# Patient Record
Sex: FEMALE | Race: WHITE | NOT HISPANIC OR LATINO | Employment: FULL TIME | URBAN - NONMETROPOLITAN AREA
[De-identification: names, ages, dates, MRNs, and addresses within clinical notes are randomized per-mention and may not be internally consistent; named-entity substitution may affect disease eponyms.]

---

## 2023-05-24 ENCOUNTER — OFFICE VISIT (OUTPATIENT)
Dept: PRIMARY CARE | Facility: CLINIC | Age: 48
End: 2023-05-24
Payer: COMMERCIAL

## 2023-05-24 VITALS
DIASTOLIC BLOOD PRESSURE: 85 MMHG | WEIGHT: 165 LBS | SYSTOLIC BLOOD PRESSURE: 133 MMHG | BODY MASS INDEX: 29.23 KG/M2 | HEART RATE: 94 BPM | HEIGHT: 63 IN

## 2023-05-24 DIAGNOSIS — G43.709 CHRONIC MIGRAINE WITHOUT AURA WITHOUT STATUS MIGRAINOSUS, NOT INTRACTABLE: ICD-10-CM

## 2023-05-24 DIAGNOSIS — Z12.31 SCREENING MAMMOGRAM, ENCOUNTER FOR: Primary | ICD-10-CM

## 2023-05-24 DIAGNOSIS — F41.9 ANXIETY: ICD-10-CM

## 2023-05-24 DIAGNOSIS — J30.2 SEASONAL ALLERGIES: ICD-10-CM

## 2023-05-24 DIAGNOSIS — I10 PRIMARY HYPERTENSION: ICD-10-CM

## 2023-05-24 DIAGNOSIS — G43.709 CHRONIC MIGRAINE WITHOUT AURA WITHOUT STATUS MIGRAINOSUS, NOT INTRACTABLE: Primary | ICD-10-CM

## 2023-05-24 DIAGNOSIS — F51.01 PRIMARY INSOMNIA: ICD-10-CM

## 2023-05-24 DIAGNOSIS — K21.9 GASTROESOPHAGEAL REFLUX DISEASE WITHOUT ESOPHAGITIS: ICD-10-CM

## 2023-05-24 PROBLEM — G47.00 INSOMNIA: Status: ACTIVE | Noted: 2023-05-24

## 2023-05-24 PROCEDURE — 3079F DIAST BP 80-89 MM HG: CPT | Performed by: INTERNAL MEDICINE

## 2023-05-24 PROCEDURE — 1036F TOBACCO NON-USER: CPT | Performed by: INTERNAL MEDICINE

## 2023-05-24 PROCEDURE — 99214 OFFICE O/P EST MOD 30 MIN: CPT | Performed by: INTERNAL MEDICINE

## 2023-05-24 PROCEDURE — 3075F SYST BP GE 130 - 139MM HG: CPT | Performed by: INTERNAL MEDICINE

## 2023-05-24 RX ORDER — GALCANEZUMAB 120 MG/ML
INJECTION, SOLUTION SUBCUTANEOUS
COMMUNITY
Start: 2023-04-19 | End: 2023-05-24 | Stop reason: SDUPTHER

## 2023-05-24 RX ORDER — GALCANEZUMAB 120 MG/ML
INJECTION, SOLUTION SUBCUTANEOUS
Qty: 1 ML | Refills: 11 | OUTPATIENT
Start: 2023-05-24

## 2023-05-24 RX ORDER — GABAPENTIN 100 MG/1
1 CAPSULE ORAL 2 TIMES DAILY
COMMUNITY
Start: 2022-05-19 | End: 2023-05-24 | Stop reason: SDUPTHER

## 2023-05-24 RX ORDER — GALCANEZUMAB 120 MG/ML
120 INJECTION, SOLUTION SUBCUTANEOUS
Qty: 1 ML | Refills: 3 | Status: SHIPPED | OUTPATIENT
Start: 2023-05-24

## 2023-05-24 RX ORDER — MULTIVITAMIN
1 TABLET ORAL DAILY
COMMUNITY
Start: 2022-05-16

## 2023-05-24 RX ORDER — BUPROPION HYDROCHLORIDE 150 MG/1
150 TABLET ORAL DAILY
COMMUNITY
End: 2023-05-24 | Stop reason: ALTCHOICE

## 2023-05-24 RX ORDER — OMEPRAZOLE 10 MG/1
20 CAPSULE, DELAYED RELEASE ORAL DAILY
COMMUNITY

## 2023-05-24 RX ORDER — GABAPENTIN 100 MG/1
200 CAPSULE ORAL 3 TIMES DAILY
Qty: 180 CAPSULE | Refills: 2 | Status: SHIPPED | OUTPATIENT
Start: 2023-05-24 | End: 2023-08-28

## 2023-05-24 RX ORDER — RIZATRIPTAN BENZOATE 10 MG/1
10 TABLET ORAL ONCE AS NEEDED
Qty: 9 TABLET | Refills: 3 | Status: SHIPPED | OUTPATIENT
Start: 2023-05-24 | End: 2023-06-23

## 2023-05-24 RX ORDER — LORATADINE 10 MG/1
1 CAPSULE, LIQUID FILLED ORAL DAILY PRN
COMMUNITY
Start: 2022-05-19

## 2023-05-24 RX ORDER — GALCANEZUMAB 120 MG/ML
INJECTION, SOLUTION SUBCUTANEOUS
COMMUNITY
Start: 2022-05-19 | End: 2023-05-24 | Stop reason: ALTCHOICE

## 2023-05-24 RX ORDER — LOSARTAN POTASSIUM AND HYDROCHLOROTHIAZIDE 12.5; 5 MG/1; MG/1
1 TABLET ORAL DAILY
COMMUNITY
Start: 2023-05-13 | End: 2023-11-01 | Stop reason: SDUPTHER

## 2023-05-24 RX ORDER — GALCANEZUMAB 120 MG/ML
INJECTION, SOLUTION SUBCUTANEOUS
Qty: 1 ML | Refills: 11 | Status: SHIPPED | OUTPATIENT
Start: 2023-05-24 | End: 2023-05-25 | Stop reason: ALTCHOICE

## 2023-05-24 ASSESSMENT — ENCOUNTER SYMPTOMS
SINUS PAIN: 0
APPETITE CHANGE: 0
WHEEZING: 0
SINUS PRESSURE: 0
FATIGUE: 0
NAUSEA: 0
CHILLS: 0
VOMITING: 0
ABDOMINAL DISTENTION: 0
COUGH: 0
WEAKNESS: 0
BACK PAIN: 0
SHORTNESS OF BREATH: 0
NUMBNESS: 0
HEADACHES: 1
DIARRHEA: 0
ACTIVITY CHANGE: 0
SORE THROAT: 0

## 2023-05-24 ASSESSMENT — PATIENT HEALTH QUESTIONNAIRE - PHQ9
1. LITTLE INTEREST OR PLEASURE IN DOING THINGS: NOT AT ALL
SUM OF ALL RESPONSES TO PHQ9 QUESTIONS 1 AND 2: 0
2. FEELING DOWN, DEPRESSED OR HOPELESS: NOT AT ALL

## 2023-05-24 NOTE — PROGRESS NOTES
"Subjective   Patient ID: Sandra Platt is a 48 y.o. female who presents for Annual Exam and Med Dose Change (Asking about dose increase on her Gabapentin ).  HPI    Patient is here today for annual follow up.     Patient is currently taking the gabapentin 2 caps po bid, usually at night and as needed when she has a bad headache.   She stopped taking the wellbutrin because she thought that was making her headaches worse, so she stopped that and that did improve.       Review of Systems   Constitutional:  Negative for activity change, appetite change, chills and fatigue.   HENT:  Negative for congestion, postnasal drip, sinus pressure, sinus pain and sore throat.    Respiratory:  Negative for cough, shortness of breath and wheezing.    Cardiovascular:  Negative for chest pain and leg swelling.   Gastrointestinal:  Negative for abdominal distention, diarrhea, nausea and vomiting.   Musculoskeletal:  Negative for back pain.   Neurological:  Positive for headaches. Negative for weakness and numbness.       Objective   /85 (BP Location: Right arm, Patient Position: Sitting, BP Cuff Size: Adult)   Pulse 94   Ht 1.6 m (5' 3\")   Wt 74.8 kg (165 lb)   BMI 29.23 kg/m²     Physical Exam  Constitutional:       General: She is not in acute distress.     Appearance: Normal appearance.   HENT:      Head: Normocephalic.      Right Ear: Tympanic membrane, ear canal and external ear normal.      Left Ear: Tympanic membrane, ear canal and external ear normal.      Nose: Nose normal.      Mouth/Throat:      Mouth: Mucous membranes are moist.      Pharynx: Oropharynx is clear. No oropharyngeal exudate.   Eyes:      General:         Right eye: No discharge.         Left eye: No discharge.      Extraocular Movements: Extraocular movements intact.      Pupils: Pupils are equal, round, and reactive to light.   Cardiovascular:      Rate and Rhythm: Normal rate and regular rhythm.      Heart sounds: No murmur heard.     No " gallop.   Pulmonary:      Effort: Pulmonary effort is normal. No respiratory distress.      Breath sounds: Normal breath sounds. No wheezing.   Musculoskeletal:         General: No swelling. Normal range of motion.   Skin:     General: Skin is warm and dry.      Coloration: Skin is not jaundiced.   Neurological:      General: No focal deficit present.      Mental Status: She is alert and oriented to person, place, and time.      Cranial Nerves: No cranial nerve deficit.   Psychiatric:         Mood and Affect: Mood normal.         Behavior: Behavior normal.           Assessment/Plan   Problem List Items Addressed This Visit          Nervous    Insomnia       Circulatory    HTN (hypertension)       Digestive    GERD (gastroesophageal reflux disease)       Other    Anxiety    Migraine headache    Seasonal allergies     Other Visit Diagnoses       Screening mammogram, encounter for    -  Primary          Immunizations:  Influenza Vaccine: declines   Prevnar 13 Vaccine --   Pneumovax 23 Vaccine --  Shingrix Vaccine: --   COVID: 2      Health Maintenance:  Breast Cancer screenin, will order   Bone Density: n/a   Cervical Cancer Screening: overdue   Colon Cancer Screenin     1. HTN , controlled   - continue losartan-hctz 50-12.5 mg po daily    - declines screening labwork     2. Anxiety, controlled   - off wellbutrin      3. Migraines , controlled   - Emgality q mo (refilled)   - maxalt prn      4. GERD   - continue omeprazole 20mg po daily      5. Will order mammo   Final diagnoses:   [Z12.31] Screening mammogram, encounter for   [F51.01] Primary insomnia   [I10] Primary hypertension   [K21.9] Gastroesophageal reflux disease without esophagitis   [J30.2] Seasonal allergies   [G43.709] Chronic migraine without aura without status migrainosus, not intractable   [F41.9] Anxiety

## 2023-05-25 RX ORDER — GALCANEZUMAB 120 MG/ML
120 INJECTION, SOLUTION SUBCUTANEOUS
Qty: 1 EACH | Refills: 5 | Status: SHIPPED | OUTPATIENT
Start: 2023-05-25 | End: 2023-09-18 | Stop reason: SDUPTHER

## 2023-08-28 DIAGNOSIS — G43.709 CHRONIC MIGRAINE WITHOUT AURA WITHOUT STATUS MIGRAINOSUS, NOT INTRACTABLE: ICD-10-CM

## 2023-08-28 RX ORDER — GABAPENTIN 100 MG/1
200 CAPSULE ORAL 3 TIMES DAILY
Qty: 180 CAPSULE | Refills: 2 | Status: SHIPPED | OUTPATIENT
Start: 2023-08-28 | End: 2023-12-12

## 2023-09-18 DIAGNOSIS — G43.709 CHRONIC MIGRAINE WITHOUT AURA WITHOUT STATUS MIGRAINOSUS, NOT INTRACTABLE: ICD-10-CM

## 2023-09-18 RX ORDER — GALCANEZUMAB 120 MG/ML
120 INJECTION, SOLUTION SUBCUTANEOUS
Qty: 1 EACH | Refills: 5 | Status: SHIPPED | OUTPATIENT
Start: 2023-09-18 | End: 2024-05-31 | Stop reason: SDUPTHER

## 2023-11-01 DIAGNOSIS — I10 PRIMARY HYPERTENSION: Primary | ICD-10-CM

## 2023-11-01 RX ORDER — LOSARTAN POTASSIUM AND HYDROCHLOROTHIAZIDE 12.5; 5 MG/1; MG/1
1 TABLET ORAL DAILY
Qty: 90 TABLET | Refills: 3 | Status: SHIPPED | OUTPATIENT
Start: 2023-11-01

## 2023-11-14 ENCOUNTER — TELEPHONE (OUTPATIENT)
Dept: PRIMARY CARE | Facility: CLINIC | Age: 48
End: 2023-11-14
Payer: COMMERCIAL

## 2023-11-14 DIAGNOSIS — N30.00 ACUTE CYSTITIS WITHOUT HEMATURIA: Primary | ICD-10-CM

## 2023-11-14 RX ORDER — NITROFURANTOIN 25; 75 MG/1; MG/1
100 CAPSULE ORAL 2 TIMES DAILY
Qty: 10 CAPSULE | Refills: 0 | Status: SHIPPED | OUTPATIENT
Start: 2023-11-14 | End: 2023-11-19

## 2023-11-14 NOTE — TELEPHONE ENCOUNTER
Pt thinks she has a UTI, urgency, frequent urination, burning, pressure, chills started yesterday, worse today,  please advise

## 2023-12-12 DIAGNOSIS — G43.709 CHRONIC MIGRAINE WITHOUT AURA WITHOUT STATUS MIGRAINOSUS, NOT INTRACTABLE: ICD-10-CM

## 2023-12-12 RX ORDER — GABAPENTIN 100 MG/1
200 CAPSULE ORAL 3 TIMES DAILY
Qty: 180 CAPSULE | Refills: 2 | Status: SHIPPED | OUTPATIENT
Start: 2023-12-12 | End: 2024-04-01

## 2023-12-26 ENCOUNTER — OFFICE VISIT (OUTPATIENT)
Dept: PRIMARY CARE | Facility: CLINIC | Age: 48
End: 2023-12-26
Payer: COMMERCIAL

## 2023-12-26 VITALS
BODY MASS INDEX: 30.32 KG/M2 | WEIGHT: 171.1 LBS | HEIGHT: 63 IN | HEART RATE: 75 BPM | DIASTOLIC BLOOD PRESSURE: 93 MMHG | SYSTOLIC BLOOD PRESSURE: 150 MMHG | TEMPERATURE: 98.4 F

## 2023-12-26 DIAGNOSIS — B02.9 HERPES ZOSTER INFECTION OF LUMBAR REGION: Primary | ICD-10-CM

## 2023-12-26 PROCEDURE — 1036F TOBACCO NON-USER: CPT | Performed by: PHYSICIAN ASSISTANT

## 2023-12-26 PROCEDURE — 99214 OFFICE O/P EST MOD 30 MIN: CPT | Performed by: PHYSICIAN ASSISTANT

## 2023-12-26 PROCEDURE — 3077F SYST BP >= 140 MM HG: CPT | Performed by: PHYSICIAN ASSISTANT

## 2023-12-26 PROCEDURE — 3080F DIAST BP >= 90 MM HG: CPT | Performed by: PHYSICIAN ASSISTANT

## 2023-12-26 RX ORDER — TRAMADOL HYDROCHLORIDE 50 MG/1
50 TABLET ORAL EVERY 4 HOURS PRN
Qty: 18 TABLET | Refills: 0 | Status: SHIPPED | OUTPATIENT
Start: 2023-12-26 | End: 2024-01-04 | Stop reason: HOSPADM

## 2023-12-26 RX ORDER — PREDNISONE 10 MG/1
10 TABLET ORAL 2 TIMES DAILY
Qty: 10 TABLET | Refills: 0 | Status: SHIPPED | OUTPATIENT
Start: 2023-12-26 | End: 2024-01-04 | Stop reason: HOSPADM

## 2023-12-26 RX ORDER — VALACYCLOVIR HYDROCHLORIDE 1 G/1
1000 TABLET, FILM COATED ORAL 3 TIMES DAILY
Qty: 21 TABLET | Refills: 0 | Status: SHIPPED | OUTPATIENT
Start: 2023-12-26 | End: 2024-01-04 | Stop reason: HOSPADM

## 2023-12-26 ASSESSMENT — PATIENT HEALTH QUESTIONNAIRE - PHQ9
SUM OF ALL RESPONSES TO PHQ9 QUESTIONS 1 AND 2: 0
1. LITTLE INTEREST OR PLEASURE IN DOING THINGS: NOT AT ALL
2. FEELING DOWN, DEPRESSED OR HOPELESS: NOT AT ALL

## 2023-12-26 NOTE — PROGRESS NOTES
"Subjective   Patient ID: Sandra Platt is a 48 y.o. female who presents for Rash (?Shingles of right leg and lower back with irritation and discomfort x Sunday.).  HPI    Patient presents for evaluation of rash.  Patient reports onset of a painful rash 2 days ago after having some preceding pain in that area consistent with the L3/L4 dermatome starting at the low back wrapping around and involving the proximal lower extremity on the right.  No prior similar incidents reported.  Patient has had varicella virus as a kid.  Patient is allergic to NSAIDs.  Tylenol has been somewhat helpful.  Patient does take gabapentin which has been helpful as well.    Review of Systems    Constitutional:  See HPI   Integumentary: See HPI  Neurologic:  Alert and oriented X4, No numbness, No tingling.    All other systems are negative     Objective     BP (!) 150/93   Pulse 75   Temp 36.9 °C (98.4 °F) (Temporal)   Ht 1.6 m (5' 3\")   Wt 77.6 kg (171 lb 1.6 oz)   BMI 30.31 kg/m²     Physical Exam    General:  Alert and oriented, No acute distress.    Eye:  Pupils are equal, round and reactive to light, Normal conjunctiva.    HENT:  Normocephalic,   Neck:  Supple    Respiratory: Respirations are non-labored   Musculoskeletal: Normal ROM and strength  Integumentary: Right lower back and right lower extremity with a band of scattered, vesicular, tender, patches; all visible vesicles are intact  Neurologic:  Alert, Oriented, Normal sensory, Cranial Nerves II-XII are grossly intact  Psychiatric:  Cooperative, Appropriate mood & affect.    Assessment/Plan   Herpes zoster lumbar dermatome: Valtrex, low-dose prednisone, and tramadol.  OARRS reviewed, appropriate, and consitent with provided history    Problem List Items Addressed This Visit    None  Visit Diagnoses       Herpes zoster infection of lumbar region    -  Primary    Relevant Medications    valACYclovir (Valtrex) 1 gram tablet    predniSONE (Deltasone) 10 mg tablet    " traMADol (Ultram) 50 mg tablet            Final diagnoses:   [B02.9] Herpes zoster infection of lumbar region

## 2024-01-03 ENCOUNTER — APPOINTMENT (OUTPATIENT)
Dept: RADIOLOGY | Facility: HOSPITAL | Age: 49
End: 2024-01-03
Payer: COMMERCIAL

## 2024-01-03 ENCOUNTER — HOSPITAL ENCOUNTER (OUTPATIENT)
Facility: HOSPITAL | Age: 49
Setting detail: OBSERVATION
Discharge: HOME | End: 2024-01-04
Attending: EMERGENCY MEDICINE | Admitting: INTERNAL MEDICINE
Payer: COMMERCIAL

## 2024-01-03 DIAGNOSIS — R11.2 NAUSEA AND VOMITING, UNSPECIFIED VOMITING TYPE: ICD-10-CM

## 2024-01-03 DIAGNOSIS — U07.1 COVID-19: ICD-10-CM

## 2024-01-03 DIAGNOSIS — R10.84 ABDOMINAL PAIN, GENERALIZED: Primary | ICD-10-CM

## 2024-01-03 DIAGNOSIS — K52.9 ENTEROCOLITIS: ICD-10-CM

## 2024-01-03 LAB
ALBUMIN SERPL BCP-MCNC: 4.6 G/DL (ref 3.4–5)
ALP SERPL-CCNC: 48 U/L (ref 33–110)
ALT SERPL W P-5'-P-CCNC: 19 U/L (ref 7–45)
ANION GAP SERPL CALC-SCNC: 14 MMOL/L (ref 10–20)
APPEARANCE UR: ABNORMAL
AST SERPL W P-5'-P-CCNC: 17 U/L (ref 9–39)
BACTERIA #/AREA URNS AUTO: ABNORMAL /HPF
BASOPHILS # BLD AUTO: 0.02 X10*3/UL (ref 0–0.1)
BASOPHILS NFR BLD AUTO: 0.2 %
BILIRUB SERPL-MCNC: 0.5 MG/DL (ref 0–1.2)
BILIRUB UR STRIP.AUTO-MCNC: NEGATIVE MG/DL
BUN SERPL-MCNC: 12 MG/DL (ref 6–23)
CALCIUM SERPL-MCNC: 9.7 MG/DL (ref 8.6–10.3)
CHLORIDE SERPL-SCNC: 101 MMOL/L (ref 98–107)
CO2 SERPL-SCNC: 25 MMOL/L (ref 21–32)
COLOR UR: ABNORMAL
CREAT SERPL-MCNC: 0.48 MG/DL (ref 0.5–1.05)
CRP SERPL-MCNC: 2.21 MG/DL
EOSINOPHIL # BLD AUTO: 0.11 X10*3/UL (ref 0–0.7)
EOSINOPHIL NFR BLD AUTO: 1.2 %
ERYTHROCYTE [DISTWIDTH] IN BLOOD BY AUTOMATED COUNT: 12.6 % (ref 11.5–14.5)
GFR SERPL CREATININE-BSD FRML MDRD: >90 ML/MIN/1.73M*2
GLUCOSE SERPL-MCNC: 107 MG/DL (ref 74–99)
GLUCOSE UR STRIP.AUTO-MCNC: NEGATIVE MG/DL
HCT VFR BLD AUTO: 45.1 % (ref 36–46)
HGB BLD-MCNC: 15 G/DL (ref 12–16)
IMM GRANULOCYTES # BLD AUTO: 0.03 X10*3/UL (ref 0–0.7)
IMM GRANULOCYTES NFR BLD AUTO: 0.3 % (ref 0–0.9)
KETONES UR STRIP.AUTO-MCNC: ABNORMAL MG/DL
LACTATE SERPL-SCNC: 0.8 MMOL/L (ref 0.4–2)
LEUKOCYTE ESTERASE UR QL STRIP.AUTO: NEGATIVE
LIPASE SERPL-CCNC: 18 U/L (ref 9–82)
LYMPHOCYTES # BLD AUTO: 0.9 X10*3/UL (ref 1.2–4.8)
LYMPHOCYTES NFR BLD AUTO: 10.2 %
MAGNESIUM SERPL-MCNC: 1.86 MG/DL (ref 1.6–2.4)
MCH RBC QN AUTO: 31.4 PG (ref 26–34)
MCHC RBC AUTO-ENTMCNC: 33.3 G/DL (ref 32–36)
MCV RBC AUTO: 94 FL (ref 80–100)
MONOCYTES # BLD AUTO: 0.5 X10*3/UL (ref 0.1–1)
MONOCYTES NFR BLD AUTO: 5.6 %
MUCOUS THREADS #/AREA URNS AUTO: ABNORMAL /LPF
NEUTROPHILS # BLD AUTO: 7.29 X10*3/UL (ref 1.2–7.7)
NEUTROPHILS NFR BLD AUTO: 82.5 %
NITRITE UR QL STRIP.AUTO: NEGATIVE
NRBC BLD-RTO: 0 /100 WBCS (ref 0–0)
PH UR STRIP.AUTO: 5 [PH]
PLATELET # BLD AUTO: 214 X10*3/UL (ref 150–450)
POTASSIUM SERPL-SCNC: 3.9 MMOL/L (ref 3.5–5.3)
PROT SERPL-MCNC: 7.7 G/DL (ref 6.4–8.2)
PROT UR STRIP.AUTO-MCNC: ABNORMAL MG/DL
RBC # BLD AUTO: 4.78 X10*6/UL (ref 4–5.2)
RBC # UR STRIP.AUTO: NEGATIVE /UL
RBC #/AREA URNS AUTO: ABNORMAL /HPF
SODIUM SERPL-SCNC: 136 MMOL/L (ref 136–145)
SP GR UR STRIP.AUTO: 1.02
SQUAMOUS #/AREA URNS AUTO: ABNORMAL /HPF
UROBILINOGEN UR STRIP.AUTO-MCNC: <2 MG/DL
WBC # BLD AUTO: 8.9 X10*3/UL (ref 4.4–11.3)
WBC #/AREA URNS AUTO: ABNORMAL /HPF

## 2024-01-03 PROCEDURE — 80053 COMPREHEN METABOLIC PANEL: CPT | Performed by: EMERGENCY MEDICINE

## 2024-01-03 PROCEDURE — 83690 ASSAY OF LIPASE: CPT | Performed by: EMERGENCY MEDICINE

## 2024-01-03 PROCEDURE — 71045 X-RAY EXAM CHEST 1 VIEW: CPT

## 2024-01-03 PROCEDURE — 2550000001 HC RX 255 CONTRASTS: Performed by: EMERGENCY MEDICINE

## 2024-01-03 PROCEDURE — 2500000004 HC RX 250 GENERAL PHARMACY W/ HCPCS (ALT 636 FOR OP/ED): Performed by: EMERGENCY MEDICINE

## 2024-01-03 PROCEDURE — 81001 URINALYSIS AUTO W/SCOPE: CPT | Performed by: EMERGENCY MEDICINE

## 2024-01-03 PROCEDURE — 74177 CT ABD & PELVIS W/CONTRAST: CPT | Mod: FR

## 2024-01-03 PROCEDURE — 96361 HYDRATE IV INFUSION ADD-ON: CPT | Performed by: INTERNAL MEDICINE

## 2024-01-03 PROCEDURE — C9113 INJ PANTOPRAZOLE SODIUM, VIA: HCPCS | Performed by: EMERGENCY MEDICINE

## 2024-01-03 PROCEDURE — 83735 ASSAY OF MAGNESIUM: CPT | Performed by: EMERGENCY MEDICINE

## 2024-01-03 PROCEDURE — 36415 COLL VENOUS BLD VENIPUNCTURE: CPT | Performed by: EMERGENCY MEDICINE

## 2024-01-03 PROCEDURE — 85025 COMPLETE CBC W/AUTO DIFF WBC: CPT | Performed by: EMERGENCY MEDICINE

## 2024-01-03 PROCEDURE — 96375 TX/PRO/DX INJ NEW DRUG ADDON: CPT | Performed by: INTERNAL MEDICINE

## 2024-01-03 PROCEDURE — 99285 EMERGENCY DEPT VISIT HI MDM: CPT | Performed by: EMERGENCY MEDICINE

## 2024-01-03 PROCEDURE — 86140 C-REACTIVE PROTEIN: CPT | Performed by: EMERGENCY MEDICINE

## 2024-01-03 PROCEDURE — 74176 CT ABD & PELVIS W/O CONTRAST: CPT

## 2024-01-03 PROCEDURE — 74177 CT ABD & PELVIS W/CONTRAST: CPT | Mod: FOREIGN READ | Performed by: RADIOLOGY

## 2024-01-03 PROCEDURE — 71045 X-RAY EXAM CHEST 1 VIEW: CPT | Performed by: RADIOLOGY

## 2024-01-03 PROCEDURE — 83605 ASSAY OF LACTIC ACID: CPT | Performed by: EMERGENCY MEDICINE

## 2024-01-03 RX ORDER — SUCRALFATE 1 G/10ML
1 SUSPENSION ORAL EVERY 6 HOURS SCHEDULED
Status: DISCONTINUED | OUTPATIENT
Start: 2024-01-03 | End: 2024-01-04 | Stop reason: HOSPADM

## 2024-01-03 RX ORDER — SUCRALFATE 1 G/10ML
1 SUSPENSION ORAL EVERY 6 HOURS SCHEDULED
Status: DISCONTINUED | OUTPATIENT
Start: 2024-01-04 | End: 2024-01-03

## 2024-01-03 RX ORDER — PANTOPRAZOLE SODIUM 40 MG/10ML
40 INJECTION, POWDER, LYOPHILIZED, FOR SOLUTION INTRAVENOUS ONCE
Status: COMPLETED | OUTPATIENT
Start: 2024-01-03 | End: 2024-01-03

## 2024-01-03 RX ORDER — SODIUM CHLORIDE 9 MG/ML
125 INJECTION, SOLUTION INTRAVENOUS CONTINUOUS
Status: DISCONTINUED | OUTPATIENT
Start: 2024-01-03 | End: 2024-01-04

## 2024-01-03 RX ORDER — ONDANSETRON HYDROCHLORIDE 2 MG/ML
4 INJECTION, SOLUTION INTRAVENOUS ONCE
Status: COMPLETED | OUTPATIENT
Start: 2024-01-03 | End: 2024-01-03

## 2024-01-03 RX ADMIN — SUCRALFATE 1 G: 1 SUSPENSION ORAL at 22:23

## 2024-01-03 RX ADMIN — PANTOPRAZOLE SODIUM 40 MG: 40 INJECTION, POWDER, FOR SOLUTION INTRAVENOUS at 22:23

## 2024-01-03 RX ADMIN — ONDANSETRON 4 MG: 2 INJECTION INTRAMUSCULAR; INTRAVENOUS at 20:08

## 2024-01-03 RX ADMIN — SODIUM CHLORIDE 125 ML/HR: 9 INJECTION, SOLUTION INTRAVENOUS at 22:27

## 2024-01-03 RX ADMIN — SODIUM CHLORIDE 1000 ML: 9 INJECTION, SOLUTION INTRAVENOUS at 20:08

## 2024-01-03 RX ADMIN — IOHEXOL 68 ML: 350 INJECTION, SOLUTION INTRAVENOUS at 21:19

## 2024-01-03 ASSESSMENT — PAIN DESCRIPTION - DESCRIPTORS
DESCRIPTORS: CRAMPING
DESCRIPTORS: CRAMPING

## 2024-01-03 ASSESSMENT — PAIN DESCRIPTION - LOCATION: LOCATION: ABDOMEN

## 2024-01-03 ASSESSMENT — COLUMBIA-SUICIDE SEVERITY RATING SCALE - C-SSRS
2. HAVE YOU ACTUALLY HAD ANY THOUGHTS OF KILLING YOURSELF?: NO
6. HAVE YOU EVER DONE ANYTHING, STARTED TO DO ANYTHING, OR PREPARED TO DO ANYTHING TO END YOUR LIFE?: NO
1. IN THE PAST MONTH, HAVE YOU WISHED YOU WERE DEAD OR WISHED YOU COULD GO TO SLEEP AND NOT WAKE UP?: NO

## 2024-01-03 ASSESSMENT — PAIN DESCRIPTION - FREQUENCY: FREQUENCY: CONSTANT/CONTINUOUS

## 2024-01-03 ASSESSMENT — PAIN - FUNCTIONAL ASSESSMENT: PAIN_FUNCTIONAL_ASSESSMENT: 0-10

## 2024-01-03 ASSESSMENT — PAIN SCALES - GENERAL: PAINLEVEL_OUTOF10: 4

## 2024-01-03 ASSESSMENT — PAIN DESCRIPTION - PAIN TYPE: TYPE: ACUTE PAIN

## 2024-01-04 VITALS
HEIGHT: 63 IN | WEIGHT: 172.84 LBS | OXYGEN SATURATION: 97 % | BODY MASS INDEX: 30.62 KG/M2 | RESPIRATION RATE: 18 BRPM | TEMPERATURE: 97.9 F | DIASTOLIC BLOOD PRESSURE: 78 MMHG | SYSTOLIC BLOOD PRESSURE: 111 MMHG | HEART RATE: 74 BPM

## 2024-01-04 PROBLEM — R10.9 ABDOMINAL PAIN: Status: ACTIVE | Noted: 2024-01-04

## 2024-01-04 PROBLEM — R10.9 ABDOMINAL PAIN: Status: RESOLVED | Noted: 2024-01-04 | Resolved: 2024-01-04

## 2024-01-04 LAB
ALBUMIN SERPL BCP-MCNC: 3.6 G/DL (ref 3.4–5)
ALP SERPL-CCNC: 36 U/L (ref 33–110)
ALT SERPL W P-5'-P-CCNC: 14 U/L (ref 7–45)
ANION GAP SERPL CALC-SCNC: 10 MMOL/L (ref 10–20)
ANION GAP SERPL CALC-SCNC: 10 MMOL/L (ref 10–20)
AST SERPL W P-5'-P-CCNC: 10 U/L (ref 9–39)
BILIRUB SERPL-MCNC: 0.4 MG/DL (ref 0–1.2)
BUN SERPL-MCNC: 7 MG/DL (ref 6–23)
BUN SERPL-MCNC: 9 MG/DL (ref 6–23)
CALCIUM SERPL-MCNC: 8.2 MG/DL (ref 8.6–10.3)
CALCIUM SERPL-MCNC: 8.5 MG/DL (ref 8.6–10.3)
CHLORIDE SERPL-SCNC: 105 MMOL/L (ref 98–107)
CHLORIDE SERPL-SCNC: 109 MMOL/L (ref 98–107)
CO2 SERPL-SCNC: 25 MMOL/L (ref 21–32)
CO2 SERPL-SCNC: 25 MMOL/L (ref 21–32)
CREAT SERPL-MCNC: 0.47 MG/DL (ref 0.5–1.05)
CREAT SERPL-MCNC: 0.56 MG/DL (ref 0.5–1.05)
ERYTHROCYTE [DISTWIDTH] IN BLOOD BY AUTOMATED COUNT: 12.8 % (ref 11.5–14.5)
ERYTHROCYTE [DISTWIDTH] IN BLOOD BY AUTOMATED COUNT: 12.9 % (ref 11.5–14.5)
GFR SERPL CREATININE-BSD FRML MDRD: >90 ML/MIN/1.73M*2
GFR SERPL CREATININE-BSD FRML MDRD: >90 ML/MIN/1.73M*2
GLUCOSE SERPL-MCNC: 90 MG/DL (ref 74–99)
GLUCOSE SERPL-MCNC: 99 MG/DL (ref 74–99)
HCT VFR BLD AUTO: 36.7 % (ref 36–46)
HCT VFR BLD AUTO: 37.3 % (ref 36–46)
HGB BLD-MCNC: 12.1 G/DL (ref 12–16)
HGB BLD-MCNC: 12.2 G/DL (ref 12–16)
IGA SERPL-MCNC: 87 MG/DL (ref 70–400)
MCH RBC QN AUTO: 31.7 PG (ref 26–34)
MCH RBC QN AUTO: 31.9 PG (ref 26–34)
MCHC RBC AUTO-ENTMCNC: 32.4 G/DL (ref 32–36)
MCHC RBC AUTO-ENTMCNC: 33.2 G/DL (ref 32–36)
MCV RBC AUTO: 96 FL (ref 80–100)
MCV RBC AUTO: 98 FL (ref 80–100)
NRBC BLD-RTO: 0 /100 WBCS (ref 0–0)
NRBC BLD-RTO: 0 /100 WBCS (ref 0–0)
PLATELET # BLD AUTO: 171 X10*3/UL (ref 150–450)
PLATELET # BLD AUTO: 174 X10*3/UL (ref 150–450)
POTASSIUM SERPL-SCNC: 3 MMOL/L (ref 3.5–5.3)
POTASSIUM SERPL-SCNC: 3.8 MMOL/L (ref 3.5–5.3)
PROT SERPL-MCNC: 5.9 G/DL (ref 6.4–8.2)
RBC # BLD AUTO: 3.82 X10*6/UL (ref 4–5.2)
RBC # BLD AUTO: 3.82 X10*6/UL (ref 4–5.2)
SODIUM SERPL-SCNC: 137 MMOL/L (ref 136–145)
SODIUM SERPL-SCNC: 140 MMOL/L (ref 136–145)
TTG IGA SER IA-ACNC: <1 U/ML
WBC # BLD AUTO: 4 X10*3/UL (ref 4.4–11.3)
WBC # BLD AUTO: 5.8 X10*3/UL (ref 4.4–11.3)

## 2024-01-04 PROCEDURE — 85027 COMPLETE CBC AUTOMATED: CPT | Performed by: SURGERY

## 2024-01-04 PROCEDURE — 83516 IMMUNOASSAY NONANTIBODY: CPT | Mod: SAMLAB | Performed by: INTERNAL MEDICINE

## 2024-01-04 PROCEDURE — 36415 COLL VENOUS BLD VENIPUNCTURE: CPT

## 2024-01-04 PROCEDURE — 36415 COLL VENOUS BLD VENIPUNCTURE: CPT | Performed by: INTERNAL MEDICINE

## 2024-01-04 PROCEDURE — 2500000004 HC RX 250 GENERAL PHARMACY W/ HCPCS (ALT 636 FOR OP/ED): Performed by: INTERNAL MEDICINE

## 2024-01-04 PROCEDURE — 2500000004 HC RX 250 GENERAL PHARMACY W/ HCPCS (ALT 636 FOR OP/ED): Performed by: EMERGENCY MEDICINE

## 2024-01-04 PROCEDURE — 86231 EMA EACH IG CLASS: CPT | Performed by: INTERNAL MEDICINE

## 2024-01-04 PROCEDURE — 82374 ASSAY BLOOD CARBON DIOXIDE: CPT

## 2024-01-04 PROCEDURE — 96361 HYDRATE IV INFUSION ADD-ON: CPT | Performed by: INTERNAL MEDICINE

## 2024-01-04 PROCEDURE — 2500000001 HC RX 250 WO HCPCS SELF ADMINISTERED DRUGS (ALT 637 FOR MEDICARE OP): Performed by: INTERNAL MEDICINE

## 2024-01-04 PROCEDURE — 82784 ASSAY IGA/IGD/IGG/IGM EACH: CPT | Mod: SAMLAB | Performed by: INTERNAL MEDICINE

## 2024-01-04 PROCEDURE — 99222 1ST HOSP IP/OBS MODERATE 55: CPT | Performed by: SURGERY

## 2024-01-04 PROCEDURE — 2500000004 HC RX 250 GENERAL PHARMACY W/ HCPCS (ALT 636 FOR OP/ED)

## 2024-01-04 PROCEDURE — 85027 COMPLETE CBC AUTOMATED: CPT | Performed by: INTERNAL MEDICINE

## 2024-01-04 PROCEDURE — 80053 COMPREHEN METABOLIC PANEL: CPT | Performed by: INTERNAL MEDICINE

## 2024-01-04 PROCEDURE — 96366 THER/PROPH/DIAG IV INF ADDON: CPT | Performed by: INTERNAL MEDICINE

## 2024-01-04 PROCEDURE — A9698 NON-RAD CONTRAST MATERIALNOC: HCPCS | Performed by: EMERGENCY MEDICINE

## 2024-01-04 PROCEDURE — 2500000001 HC RX 250 WO HCPCS SELF ADMINISTERED DRUGS (ALT 637 FOR MEDICARE OP)

## 2024-01-04 PROCEDURE — 74176 CT ABD & PELVIS W/O CONTRAST: CPT | Mod: FOREIGN READ | Performed by: RADIOLOGY

## 2024-01-04 PROCEDURE — 96365 THER/PROPH/DIAG IV INF INIT: CPT | Performed by: INTERNAL MEDICINE

## 2024-01-04 PROCEDURE — G0378 HOSPITAL OBSERVATION PER HR: HCPCS

## 2024-01-04 PROCEDURE — 2550000001 HC RX 255 CONTRASTS: Performed by: EMERGENCY MEDICINE

## 2024-01-04 PROCEDURE — 99222 1ST HOSP IP/OBS MODERATE 55: CPT | Performed by: INTERNAL MEDICINE

## 2024-01-04 PROCEDURE — 99231 SBSQ HOSP IP/OBS SF/LOW 25: CPT

## 2024-01-04 PROCEDURE — 94760 N-INVAS EAR/PLS OXIMETRY 1: CPT

## 2024-01-04 RX ORDER — SUCRALFATE 1 G/10ML
1 SUSPENSION ORAL EVERY 6 HOURS SCHEDULED
Qty: 200 ML | Refills: 0 | Status: SHIPPED | OUTPATIENT
Start: 2024-01-04 | End: 2024-01-07

## 2024-01-04 RX ORDER — ACETAMINOPHEN 160 MG/5ML
650 SOLUTION ORAL EVERY 4 HOURS PRN
Status: DISCONTINUED | OUTPATIENT
Start: 2024-01-04 | End: 2024-01-04 | Stop reason: HOSPADM

## 2024-01-04 RX ORDER — POTASSIUM CHLORIDE 20 MEQ/1
40 TABLET, EXTENDED RELEASE ORAL ONCE
Status: COMPLETED | OUTPATIENT
Start: 2024-01-04 | End: 2024-01-04

## 2024-01-04 RX ORDER — ACETAMINOPHEN 650 MG/1
650 SUPPOSITORY RECTAL EVERY 4 HOURS PRN
Status: DISCONTINUED | OUTPATIENT
Start: 2024-01-04 | End: 2024-01-04 | Stop reason: HOSPADM

## 2024-01-04 RX ORDER — PANTOPRAZOLE SODIUM 40 MG/1
40 TABLET, DELAYED RELEASE ORAL
Status: DISCONTINUED | OUTPATIENT
Start: 2024-01-04 | End: 2024-01-04 | Stop reason: HOSPADM

## 2024-01-04 RX ORDER — LOSARTAN POTASSIUM 50 MG/1
50 TABLET ORAL DAILY
Status: DISCONTINUED | OUTPATIENT
Start: 2024-01-04 | End: 2024-01-04 | Stop reason: HOSPADM

## 2024-01-04 RX ORDER — POTASSIUM CHLORIDE 14.9 MG/ML
20 INJECTION INTRAVENOUS ONCE
Status: COMPLETED | OUTPATIENT
Start: 2024-01-04 | End: 2024-01-04

## 2024-01-04 RX ORDER — SODIUM CHLORIDE 9 MG/ML
100 INJECTION, SOLUTION INTRAVENOUS CONTINUOUS
Status: DISCONTINUED | OUTPATIENT
Start: 2024-01-04 | End: 2024-01-04

## 2024-01-04 RX ORDER — ACETAMINOPHEN 325 MG/1
650 TABLET ORAL ONCE
Status: COMPLETED | OUTPATIENT
Start: 2024-01-04 | End: 2024-01-04

## 2024-01-04 RX ORDER — LOPERAMIDE HYDROCHLORIDE 2 MG/1
2 CAPSULE ORAL 4 TIMES DAILY PRN
Status: DISCONTINUED | OUTPATIENT
Start: 2024-01-04 | End: 2024-01-04 | Stop reason: HOSPADM

## 2024-01-04 RX ORDER — GABAPENTIN 100 MG/1
200 CAPSULE ORAL 3 TIMES DAILY
Status: DISCONTINUED | OUTPATIENT
Start: 2024-01-04 | End: 2024-01-04 | Stop reason: HOSPADM

## 2024-01-04 RX ORDER — MULTIVIT-MIN/IRON FUM/FOLIC AC 7.5 MG-4
1 TABLET ORAL DAILY
Status: DISCONTINUED | OUTPATIENT
Start: 2024-01-04 | End: 2024-01-04 | Stop reason: HOSPADM

## 2024-01-04 RX ORDER — HYDROCHLOROTHIAZIDE 12.5 MG/1
12.5 CAPSULE ORAL DAILY
Status: DISCONTINUED | OUTPATIENT
Start: 2024-01-04 | End: 2024-01-04 | Stop reason: HOSPADM

## 2024-01-04 RX ORDER — ACETAMINOPHEN 325 MG/1
650 TABLET ORAL EVERY 4 HOURS PRN
Status: DISCONTINUED | OUTPATIENT
Start: 2024-01-04 | End: 2024-01-04 | Stop reason: HOSPADM

## 2024-01-04 RX ADMIN — ACETAMINOPHEN 650 MG: 325 TABLET ORAL at 02:45

## 2024-01-04 RX ADMIN — GABAPENTIN 200 MG: 100 CAPSULE ORAL at 15:21

## 2024-01-04 RX ADMIN — IOHEXOL 500 ML: 12 SOLUTION ORAL at 00:50

## 2024-01-04 RX ADMIN — LOSARTAN POTASSIUM 50 MG: 50 TABLET, FILM COATED ORAL at 11:56

## 2024-01-04 RX ADMIN — Medication 1 TABLET: at 08:52

## 2024-01-04 RX ADMIN — SODIUM CHLORIDE 100 ML/HR: 9 INJECTION, SOLUTION INTRAVENOUS at 05:02

## 2024-01-04 RX ADMIN — GABAPENTIN 200 MG: 100 CAPSULE ORAL at 08:52

## 2024-01-04 RX ADMIN — PANTOPRAZOLE SODIUM 40 MG: 40 TABLET, DELAYED RELEASE ORAL at 08:52

## 2024-01-04 RX ADMIN — SUCRALFATE 1 G: 1 SUSPENSION ORAL at 05:02

## 2024-01-04 RX ADMIN — POTASSIUM CHLORIDE 20 MEQ: 14.9 INJECTION, SOLUTION INTRAVENOUS at 08:52

## 2024-01-04 RX ADMIN — SUCRALFATE 1 G: 1 SUSPENSION ORAL at 11:56

## 2024-01-04 RX ADMIN — HYDROCHLOROTHIAZIDE 12.5 MG: 12.5 CAPSULE ORAL at 11:56

## 2024-01-04 RX ADMIN — POTASSIUM CHLORIDE 40 MEQ: 1500 TABLET, EXTENDED RELEASE ORAL at 08:52

## 2024-01-04 SDOH — SOCIAL STABILITY: SOCIAL INSECURITY: ABUSE: ADULT

## 2024-01-04 SDOH — SOCIAL STABILITY: SOCIAL INSECURITY: DOES ANYONE TRY TO KEEP YOU FROM HAVING/CONTACTING OTHER FRIENDS OR DOING THINGS OUTSIDE YOUR HOME?: NO

## 2024-01-04 SDOH — SOCIAL STABILITY: SOCIAL INSECURITY: DO YOU FEEL UNSAFE GOING BACK TO THE PLACE WHERE YOU ARE LIVING?: NO

## 2024-01-04 SDOH — SOCIAL STABILITY: SOCIAL INSECURITY: HAVE YOU HAD THOUGHTS OF HARMING ANYONE ELSE?: NO

## 2024-01-04 SDOH — SOCIAL STABILITY: SOCIAL INSECURITY: ARE THERE ANY APPARENT SIGNS OF INJURIES/BEHAVIORS THAT COULD BE RELATED TO ABUSE/NEGLECT?: NO

## 2024-01-04 SDOH — SOCIAL STABILITY: SOCIAL INSECURITY: DO YOU FEEL ANYONE HAS EXPLOITED OR TAKEN ADVANTAGE OF YOU FINANCIALLY OR OF YOUR PERSONAL PROPERTY?: NO

## 2024-01-04 SDOH — SOCIAL STABILITY: SOCIAL INSECURITY: WERE YOU ABLE TO COMPLETE ALL THE BEHAVIORAL HEALTH SCREENINGS?: YES

## 2024-01-04 SDOH — SOCIAL STABILITY: SOCIAL INSECURITY: HAS ANYONE EVER THREATENED TO HURT YOUR FAMILY OR YOUR PETS?: NO

## 2024-01-04 SDOH — SOCIAL STABILITY: SOCIAL INSECURITY: ARE YOU OR HAVE YOU BEEN THREATENED OR ABUSED PHYSICALLY, EMOTIONALLY, OR SEXUALLY BY ANYONE?: NO

## 2024-01-04 ASSESSMENT — COGNITIVE AND FUNCTIONAL STATUS - GENERAL
MOBILITY SCORE: 24
DAILY ACTIVITIY SCORE: 24
DAILY ACTIVITIY SCORE: 24
PATIENT BASELINE BEDBOUND: NO
MOBILITY SCORE: 24

## 2024-01-04 ASSESSMENT — ACTIVITIES OF DAILY LIVING (ADL)
PATIENT'S MEMORY ADEQUATE TO SAFELY COMPLETE DAILY ACTIVITIES?: YES
TOILETING: INDEPENDENT
GROOMING: INDEPENDENT
FEEDING YOURSELF: INDEPENDENT
LACK_OF_TRANSPORTATION: NO
HEARING - RIGHT EAR: FUNCTIONAL
JUDGMENT_ADEQUATE_SAFELY_COMPLETE_DAILY_ACTIVITIES: YES
ASSISTIVE_DEVICE: EYEGLASSES
BATHING: INDEPENDENT
ADEQUATE_TO_COMPLETE_ADL: YES
DRESSING YOURSELF: INDEPENDENT
HEARING - LEFT EAR: FUNCTIONAL
WALKS IN HOME: INDEPENDENT

## 2024-01-04 ASSESSMENT — LIFESTYLE VARIABLES
HOW OFTEN DO YOU HAVE 6 OR MORE DRINKS ON ONE OCCASION: LESS THAN MONTHLY
AUDIT-C TOTAL SCORE: 6
HOW OFTEN DURING THE LAST YEAR HAVE YOU FOUND THAT YOU WERE NOT ABLE TO STOP DRINKING ONCE YOU HAD STARTED: NEVER
HOW MANY STANDARD DRINKS CONTAINING ALCOHOL DO YOU HAVE ON A TYPICAL DAY: 5 OR 6
PRESCIPTION_ABUSE_PAST_12_MONTHS: NO
AUDIT-C TOTAL SCORE: 6
AUDIT TOTAL SCORE: 6
HAS A RELATIVE, FRIEND, DOCTOR, OR ANOTHER HEALTH PROFESSIONAL EXPRESSED CONCERN ABOUT YOUR DRINKING OR SUGGESTED YOU CUT DOWN: NO
SUBSTANCE_ABUSE_PAST_12_MONTHS: NO
HOW OFTEN DO YOU HAVE A DRINK CONTAINING ALCOHOL: 2-3 TIMES A WEEK
SKIP TO QUESTIONS 9-10: 0
HAVE YOU OR SOMEONE ELSE BEEN INJURED AS A RESULT OF YOUR DRINKING: NO
HOW OFTEN DURING THE LAST YEAR HAVE YOU BEEN UNABLE TO REMEMBER WHAT HAPPENED THE NIGHT BEFORE BECAUSE YOU HAD BEEN DRINKING: NEVER
HOW OFTEN DURING THE LAST YEAR HAVE YOU NEEDED AN ALCOHOLIC DRINK FIRST THING IN THE MORNING TO GET YOURSELF GOING AFTER A NIGHT OF HEAVY DRINKING: NEVER
AUDIT TOTAL SCORE: 0
HOW OFTEN DURING THE LAST YEAR HAVE YOU FAILED TO DO WHAT WAS NORMALLY EXPECTED FROM YOU BECAUSE OF DRINKING: NEVER
HOW OFTEN DURING THE LAST YEAR HAVE YOU HAD A FEELING OF GUILT OR REMORSE AFTER DRINKING: NEVER

## 2024-01-04 ASSESSMENT — PAIN - FUNCTIONAL ASSESSMENT: PAIN_FUNCTIONAL_ASSESSMENT: 0-10

## 2024-01-04 ASSESSMENT — PAIN SCALES - GENERAL
PAINLEVEL_OUTOF10: 2
PAINLEVEL_OUTOF10: 0 - NO PAIN

## 2024-01-04 ASSESSMENT — PATIENT HEALTH QUESTIONNAIRE - PHQ9
1. LITTLE INTEREST OR PLEASURE IN DOING THINGS: NOT AT ALL
2. FEELING DOWN, DEPRESSED OR HOPELESS: NOT AT ALL
SUM OF ALL RESPONSES TO PHQ9 QUESTIONS 1 & 2: 0

## 2024-01-04 ASSESSMENT — PAIN DESCRIPTION - DESCRIPTORS: DESCRIPTORS: SORE

## 2024-01-04 NOTE — ED PROVIDER NOTES
HPI   Chief Complaint   Patient presents with   • Abdominal Pain     Pt is c/o her stomach cramping.    • Nausea   • Vomiting   • Diarrhea       48-year-old female presents with several day history of nausea, vomiting and diarrhea.  Patient was treated for shingles and just diagnosed with COVID 3 days ago.  Patient is complaining of some midepigastric pain and generalized weakness.  Patient denies any chest pain or shortness of breath.  Patient will have an IV established.  The patient has remained stable while here in the department.  Patient will be placed in observation status and seen by Dr. Moncada in the morning.      History provided by:  Patient and spouse                      [place] Coma Scale Score: 15                  Patient History   Past Medical History:   Diagnosis Date   • Other specified health status     No pertinent past medical history     Past Surgical History:   Procedure Laterality Date   • OTHER SURGICAL HISTORY  05/19/2022    Ankle surgery   • OTHER SURGICAL HISTORY  05/19/2022    Breast reduction     No family history on file.  Social History     Tobacco Use   • Smoking status: Never   • Smokeless tobacco: Never   Substance Use Topics   • Alcohol use: Yes   • Drug use: Never       Physical Exam   ED Triage Vitals [01/03/24 1918]   Temp Heart Rate Resp BP   36.6 °C (97.9 °F) (!) 112 17 (!) 125/100      SpO2 Temp Source Heart Rate Source Patient Position   97 % Tympanic Monitor Lying      BP Location FiO2 (%)     Right arm --       Physical Exam  Vitals and nursing note reviewed.   Constitutional:       General: She is not in acute distress.     Appearance: She is well-developed.   HENT:      Head: Normocephalic and atraumatic.   Eyes:      Conjunctiva/sclera: Conjunctivae normal.   Cardiovascular:      Rate and Rhythm: Normal rate and regular rhythm.      Heart sounds: No murmur heard.  Pulmonary:      Effort: Pulmonary effort is normal. No respiratory distress.      Breath sounds: Normal  breath sounds.   Abdominal:      Palpations: Abdomen is soft.      Tenderness: There is abdominal tenderness in the epigastric area.   Musculoskeletal:         General: No swelling.      Cervical back: Neck supple.   Skin:     General: Skin is warm and dry.      Capillary Refill: Capillary refill takes less than 2 seconds.   Neurological:      Mental Status: She is alert.   Psychiatric:         Mood and Affect: Mood normal.       Labs Reviewed   CBC WITH AUTO DIFFERENTIAL - Abnormal       Result Value    WBC 8.9      nRBC 0.0      RBC 4.78      Hemoglobin 15.0      Hematocrit 45.1      MCV 94      MCH 31.4      MCHC 33.3      RDW 12.6      Platelets 214      Neutrophils % 82.5      Immature Granulocytes %, Automated 0.3      Lymphocytes % 10.2      Monocytes % 5.6      Eosinophils % 1.2      Basophils % 0.2      Neutrophils Absolute 7.29      Immature Granulocytes Absolute, Automated 0.03      Lymphocytes Absolute 0.90 (*)     Monocytes Absolute 0.50      Eosinophils Absolute 0.11      Basophils Absolute 0.02     COMPREHENSIVE METABOLIC PANEL - Abnormal    Glucose 107 (*)     Sodium 136      Potassium 3.9      Chloride 101      Bicarbonate 25      Anion Gap 14      Urea Nitrogen 12      Creatinine 0.48 (*)     eGFR >90      Calcium 9.7      Albumin 4.6      Alkaline Phosphatase 48      Total Protein 7.7      AST 17      Bilirubin, Total 0.5      ALT 19     C-REACTIVE PROTEIN - Abnormal    C-Reactive Protein 2.21 (*)    URINALYSIS WITH REFLEX CULTURE AND MICROSCOPIC - Abnormal    Color, Urine Shaneka (*)     Appearance, Urine Hazy (*)     Specific Gravity, Urine 1.023      pH, Urine 5.0      Protein, Urine 30 (1+) (*)     Glucose, Urine NEGATIVE      Blood, Urine NEGATIVE      Ketones, Urine 5 (TRACE) (*)     Bilirubin, Urine NEGATIVE      Urobilinogen, Urine <2.0      Nitrite, Urine NEGATIVE      Leukocyte Esterase, Urine NEGATIVE     URINALYSIS MICROSCOPIC WITH REFLEX CULTURE - Abnormal    WBC, Urine 1-5      RBC,  Urine 1-2      Squamous Epithelial Cells, Urine 1-9 (SPARSE)      Bacteria, Urine 1+ (*)     Mucus, Urine 1+     MAGNESIUM - Normal    Magnesium 1.86     LACTATE - Normal    Lactate 0.8      Narrative:     Venipuncture immediately after or during the administration of Metamizole may lead to falsely low results. Testing should be performed immediately  prior to Metamizole dosing.   LIPASE - Normal    Lipase 18      Narrative:     Venipuncture immediately after or during the administration of Metamizole may lead to falsely low results. Testing should be performed immediately prior to Metamizole dosing.   URINALYSIS WITH REFLEX CULTURE AND MICROSCOPIC    Narrative:     The following orders were created for panel order Urinalysis with Reflex Culture and Microscopic.  Procedure                               Abnormality         Status                     ---------                               -----------         ------                     Urinalysis with Reflex C...[[id number]]  Abnormal            Final result               Extra Urine Gray Tube[[id number]]                                                         Please view results for these tests on the individual orders.   EXTRA URINE GRAY TUBE      CT abdomen pelvis wo IV contrast   Final Result   Moderate amount of fluid throughout the nondilated colon which is   nonspecific but may indicate viral enterocolitis in the appropriate   clinical setting.   Nonobstructing 3 to 4 mm calculus in the lower pole the right kidney.   Signed by Jonathan Santos      CT abdomen pelvis w IV contrast   Final Result   1.Globular hyperdensity in the region of the duodenum concerning   for bleeding ulcer.  Given lack of oral contrast and focality,   ingested debris a possibility.  Clinical and/or endoscopic correlation   suggested.    2.Nonobstructive right renal calculus.    3.Some minor hyperdensity seen in the proximal jejunum as well to   lesser extent which is of indeterminate  etiology.  Hyperdense ingested   material have a the similar appearance.   4.Results discussed Dr. Ramirez at approximately 9:52 PM.   Signed by Yannick Thompson MD      XR chest 1 view   Final Result   No airspace consolidation or pleural effusion.        MACRO:   None        Signed by: Stanley Simon 1/3/2024 9:36 PM   Dictation workstation:   [id number]         ED Course & MDM   Diagnoses as of 01/04/24 0210   Abdominal pain, generalized   Enterocolitis   COVID-19       Medical Decision Making      Procedure  Procedures     Jermaine Ramirez,   01/08/24 2058

## 2024-01-04 NOTE — H&P
Medical Group History and Physical    ASSESSMENT:     Acute diarrhea, may be due to infectious (viral) colitis.    COVID 19 Infection: Patient reports she tested positive for COVID on 1/124. She has no respiratory symptoms.  History of GERD, on PPI  Suspected gluten intolerance: Patient reports long history of abstinence from gluten which has helped resolve her chronic symptoms of diarrhea and weight loss  Recent history of varicella zoster: Was completed treatment.      PLAN     Antidiarrheal, supportive IV fluid for hydration  No indication for antibiotics at this time.  Check tissue transglutaminase IgA antibody, endomysial antibody IgA titer, and total IgA to help diagnose celiac disease if present  Needs outpatient GI follow-up    VTE Prophylaxis: Early ambulation      HISTORY OF PRESENT ILLNESS:   Chief Complaint: Diarrhea and abdominal pain    History Of Present Illness:    Sandra Platt is a 48 y.o. female with a significant past medical history of undiagnosed gluten sensitivity disorder who presented to the ED with complaints of abdominal pain and diarrhea for the past 2 to 3 days.  Patient states she started having symptoms after she ate gluten containing bread which her boyfriend had mistakenly used to prepare a meal.  Since then she has had multiple episodes about 8 loose stools per day which consist of brown watery stool, nonbloody, she also reports vomiting clear liquids, no hematemesis.  She has had generalized abdominal pain.  Patient reports that she was diagnosed as being positive with COVID on 1/1/2024 right prior to the onset of her symptoms.  She has also been on antiviral for shingles since December.    Patient states she has self diagnosed gluten sensitivity disorder after she had about 50 pound weight loss about 10 years ago.  During that time there were multiple studies done, could not find the exact reason why she was losing weight.  She reports having an endoscopy that  showed gastritis.  She reports that she started avoiding gluten and since then had diarrhea and weight loss stopped until most recently.  She was initially tachycardic on arrival, but heart rate has stabilized.  Blood pressure is stable.  Rest of her vitals are unremarkable at this time.  Her labs are also essentially unremarkable except for elevated CRP of 2.2.  Initial CT was not erroneously read as concerning for bleeding ulcer, but repeat CT with contrast was done and shows shows moderate amount of fluid throughout the nondilated colon which may be due to viral enterocolitis.    Review of systems: 10 point review of systems is otherwise negative except as mentioned above.    PAST HISTORIES:       Past Medical History:  She has a past medical history of Other specified health status.    Past Surgical History:  She has a past surgical history that includes Other surgical history (05/19/2022) and Other surgical history (05/19/2022).      Social History:  She reports that she has never smoked. She has never used smokeless tobacco. She reports current alcohol use. She reports that she does not use drugs.    Family History:  No family history on file.     Allergies:  Amoxicillin-pot clavulanate, Aspirin, Cefaclor, Ibuprofen, Loracarbef, and Tetanus vaccines and toxoid    PHYSICAL EXAMS:     Physical Exam  Constitutional:       Appearance: Normal appearance. She is not ill-appearing or diaphoretic.   HENT:      Head: Normocephalic and atraumatic.      Right Ear: External ear normal.      Nose: Nose normal.      Mouth/Throat:      Mouth: Mucous membranes are moist.   Eyes:      Pupils: Pupils are equal, round, and reactive to light.   Cardiovascular:      Rate and Rhythm: Normal rate and regular rhythm.   Pulmonary:      Effort: Pulmonary effort is normal.      Breath sounds: Normal breath sounds.   Abdominal:      General: Abdomen is flat. Bowel sounds are normal. There is no distension.      Palpations: Abdomen is  soft.      Tenderness: There is no abdominal tenderness. There is no guarding.   Musculoskeletal:         General: Normal range of motion.      Cervical back: Normal range of motion.   Skin:     General: Skin is warm.   Neurological:      General: No focal deficit present.      Mental Status: She is alert and oriented to person, place, and time. Mental status is at baseline.         OBJECTIVE:       Last Recorded Vitals:  Vitals:    01/03/24 2145 01/04/24 0021 01/04/24 0239 01/04/24 0344   BP: 127/90 (!) 132/99 106/77 108/73   BP Location:       Patient Position:       Pulse: 95 95 89 79   Resp: 18 20 18 18   Temp:       TempSrc:       SpO2: 97% 94% 96% 97%   Weight:       Height:           Last I/O:  No intake/output data recorded.          Inpatient Medications:  gabapentin, 200 mg, oral, TID  losartan 50 mg, hydroCHLOROthiazide 12.5 mg for Hyzaar 50/12.5, , oral, Daily  multivitamin, 1 tablet, oral, Daily  pantoprazole, 40 mg, oral, Daily before breakfast  sucralfate, 1 g, oral, q6h SHELLEY      PRN medications: acetaminophen **OR** acetaminophen **OR** acetaminophen, loperamide    Outpatient Medications:  Prior to Admission medications    Medication Sig Start Date End Date Taking? Authorizing Provider   Bacillus subtilis-inulin 1.5 billion cell-1 gram tablet,chewable Chew. 5/19/22   Historical Provider, MD   gabapentin (Neurontin) 100 mg capsule take 2 capsules by mouth three times a day 12/12/23   Mago Gifford DO   galcanezumab (Emgality Pen) 120 mg/mL auto-injector Inject 1 Syringe (120 mg) under the skin every 28 (twenty-eight) days. 5/24/23   Mago Gifford DO   galcanezumab (Emgality Pen) 120 mg/mL auto-injector Inject 1 Syringe (120 mg) under the skin every 30 (thirty) days. 9/18/23   Mago Gifford DO   loratadine (Claritin Liqui-Gel) 10 mg capsule Take 1 tablet by mouth once daily. 5/19/22   Historical Provider, MD   losartan-hydrochlorothiazide (Hyzaar) 50-12.5 mg tablet Take 1 tablet by  "mouth once daily. as directed 11/1/23   Guanako Castlilo PA-C   multivitamin tablet Take 1 tablet by mouth once daily. 5/16/22   Historical Provider, MD   omeprazole (PriLOSEC) 10 mg DR capsule Take 1 capsule (10 mg) by mouth 2 times a day.    Historical Provider, MD   predniSONE (Deltasone) 10 mg tablet Take 1 tablet (10 mg) by mouth 2 times a day for 5 days. 12/26/23 12/31/23  Guanako Castillo PA-C   rizatriptan (Maxalt) 10 mg tablet Take 1 tablet (10 mg) by mouth 1 time if needed for migraine. May repeat in 2 hours if unresolved. Do not exceed 30 mg in 24 hours. 5/24/23 6/23/23  Mago Gifford,    traMADol (Ultram) 50 mg tablet Take 1 tablet (50 mg) by mouth every 4 hours if needed for severe pain (7 - 10) for up to 3 days. 12/26/23 12/29/23  Guanako Castillo PA-C   valACYclovir (Valtrex) 1 gram tablet Take 1 tablet (1,000 mg) by mouth 3 times a day for 7 days. 12/26/23 1/2/24  Guanako Castillo PA-C         LABS AND IMAGING:     Last Labs:  CBC - 1/3/2024:  8:07 PM  8.9 15.0 214    45.1      CMP - 1/3/2024:  8:07 PM  9.7 7.7 17 --- 0.5   _ 4.6 19 48      PTT - No results in last year.  _   _ _     No results found for: \"TROPHS\", \"BNP\", \"HGBA1C\", \"LDLCALC\", \"VLDL\"   "

## 2024-01-04 NOTE — DISCHARGE INSTRUCTIONS
Discharge:    Discharge home  Resume home meds  Escribed to [facility] pharmacy Carafate 10 mL every 6 hours x 5 days  Follow-up with PCP in 1 week  Follow-up with gastrointestinal    Thank you for allowing  [facility] to participate in your care. Return to the ER  if symptoms worsen

## 2024-01-04 NOTE — NURSING NOTE
Discharge Note: 1/4/2024 1610 Discharge instructions and pt responsibilities reviewed with pt over phone and copy given to nurse to give to pt. Abdominal pain education reviewed with pt and information sheets given. Pt verbalizes understanding of instructions received, verbalizes understanding of when to seek medical attention, denies any home going or personal care needs. Denies further questions or concerns. Reviewed follow up appts with pt and verbalizes understanding. Lemuel HERNANDEZ

## 2024-01-04 NOTE — PROGRESS NOTES
Care Transitions: Patient is medically ready for discharge today per hospitalist. Spoke to patient via bedside phone to maintain droplet precautions. Demographics and contacts verified. She lives in a one story home with significant other. She is independent with all ADL's and still drives. Discussed discharge plan. States she is returning home and denies any needs or in home services. No further needs anticipated from care transitions. Care team available upon request. Deepti Das RN/TCC

## 2024-01-04 NOTE — DISCHARGE INSTR - OTHER ORDERS
"Abdominal pain    The Basics  Written by the doctors and editors at [facility]  Are there different types of abdominal pain? -- Yes. \"Abdominal pain\" means pain in the abdomen (or belly), which is the part of the body between the chest and the genital area. This pain can happen for different reasons. It can be \"chronic,\" which means it develops over time, or \"acute,\" which means it starts suddenly. It can be mild or severe. A person might feel the pain all over their abdomen, or only in 1 part.  Abdominal pain can feel sharp or crampy, or dull and steady. Some people feel better if they curl into a ball, while others need to lie flat and completely still. People often feel sick to their stomach and retch or throw up.  Sometimes, abdominal pain can be so severe that the person has a hard time moving or breathing. Severe pain can be a medical emergency, and should be seen by a doctor or nurse right away.  What causes abdominal pain? -- Lots of different things can cause abdominal pain. Less severe pain can be due to something like a virus or a stomach inflammation (called \"gastritis\").  Acute pain that is more severe can be caused by problems with 1 or more organs in the abdomen. Organs in the abdomen can be part of the digestive, urinary, or reproductive systems (figure 1 and figure 2 and figure 3).  Conditions that affect organs in the chest or genital area can also cause pain. Even though these organs aren't in the abdomen, people might still have abdominal pain.  Common causes of acute abdominal pain in adults include:  ?Appendicitis - Appendicitis is the term for when the appendix (a long, thin pouch that hangs down from the large intestine) gets infected and inflamed.  ?Diverticulitis - Diverticulitis is an infection that develops in small pouches that can form in the intestine. This is more common in older people.  ?Gallstones - Gallstones are small stones that form inside an organ called the gallbladder, which " "stores bile, a fluid that helps the body break down fat. Many people have gallstones that do not cause them any problems. But in some cases, gallstones can cause pain.  ?Abscess - An abscess is a collection of pus from an infection. Abscesses can form anywhere in the abdomen, but they typically happen near the intestine.  ?Kidney stones - Kidney stones can form when salts and minerals that are normally in the urine build up and harden. They can cause pain when they pass through the ureters, which are the tubes that carry urine from the kidney to the bladder.  ?Bowel perforation - This is a hole in the bowel wall.  ?Perforated ulcer - This is a hole in the wall of the stomach or intestine.  ?Pancreatitis - This is the term for when the pancreas gets inflamed.  ?Ruptured cyst in the ovary - Cysts in the ovary are fluid-filled sacs. They sometimes break open or \"rupture,\" which is very painful.  ?Ectopic pregnancy - An ectopic pregnancy is a pregnancy that starts outside the uterus. In most ectopic pregnancies, this happens in 1 of the fallopian tubes (the tubes that connect the ovaries to the uterus). This can cause pain and other symptoms, and requires urgent treatment. An embryo cannot safely develop outside the uterus.  Should I see a doctor or nurse? -- Yes. If you have sudden or severe abdominal pain, call your doctor or nurse or go to the emergency department right away. Depending on the cause of your pain, you might need immediate treatment.  Will I need tests? -- Probably. The doctor or nurse will ask about your symptoms, including where your pain is and what it feels like. The location of the pain can be an important clue to the cause.  Your doctor will ask about your current and past medical conditions, and do a physical exam. They might do repeat exams over time to follow your symptoms.  Your doctor will decide which tests you should have based on your symptoms and individual situation. The tests might " include:  ?Blood tests  ?Urine tests  ?X-rays  ?An ultrasound, CT scan, or other imaging test - Imaging tests create pictures of the inside of the body.  How is abdominal pain treated? -- Treatment depends on what's causing the pain. It might include 1 or more of the following:  ?Fluids given by IV (a thin tube that goes into a vein)  ?Pain medicines  ?Antibiotic medicines to treat an infection  ?Other medicines to treat other medical conditions  ?Surgery  All topics are updated as new evidence becomes available and our peer review process is complete.  This topic retrieved from [employer] on: Nov 28, 2023.  Topic 62960 Version 14.0  Release: 31.4.2 - C31.331  © 2023 UpToDate, Inc. and/or its affiliates. All rights reserved.  figure 1: Organs inside the abdomen (belly)    figure 2: Anatomy of the urinary tract    figure 3: Female reproductive anatomy

## 2024-01-04 NOTE — CONSULTS
General Surgery Consultation    Patient: Sandra Platt  : 1975  MRN: [id number]  Date of Consultation: 24    Primary Care Provider: Mago Gifford DO  Referring Provider: Dr. Jermaine Ramirez    Chief Complaint: Abdominal pain    History of Present Illness: Sandra Platt is a 48 y.o. old female seen at the request of Dr. Ramirez for evaluation of a possible bleeding duodenal ulcer.  She presented to the emergency department last night with upper abdominal pain.  She has recently had shingles as well as COVID-19 infection.  She also is gluten sensitive and accidentally ate her significant other's honey bun which was not gluten-free yesterday.  She denies taking NSAIDs.  She denies smoking.  She has no history of peptic ulcer disease.  She had an EGD approximately 10 years ago and was found to have mild gastritis.  She does not know if she was ever tested for H. pylori.  She takes omeprazole daily.  She had a CT scan of the abdomen and pelvis for workup of the abdominal pain.  This showed a hyperdensity in the duodenum, both proximally and distally.  These appeared similar.  There was concern that the more proximal hyperdensity may represent a bleeding peptic ulcer.  She was therefore admitted for observation.  Her vitals have remained stable and within normal limits.  In the emergency department, hemoglobin was 15.  She received IV fluid hydration and hemoglobin was 12 this morning.  She had a loose bowel movement following oral contrast for CT scan and denies any gross blood in it or dark appearance to it.  She denies any dark black or tarry stool recently.  She denies any hematemesis.  She is not on any blood thinners or antiplatelet agents.    Medical History:  Hypertension  Migraines  Shingles  COVID-19 infection  History of gastritis    Surgical History:  No previous abdominal surgery.  EGD approximately 10 years ago, mild gastritis otherwise normal    Home Medications:  Prior to  Admission medications    Medication Sig Start Date End Date Taking? Authorizing Provider   gabapentin (Neurontin) 100 mg capsule take 2 capsules by mouth three times a day 12/12/23   Mago Gifford DO   galcanezumab (Emgality Pen) 120 mg/mL auto-injector Inject 1 Syringe (120 mg) under the skin every 28 (twenty-eight) days. 5/24/23   Mago Gifford DO   galcanezumab (Emgality Pen) 120 mg/mL auto-injector Inject 1 Syringe (120 mg) under the skin every 30 (thirty) days. 9/18/23   Mago Gifford DO   loratadine (Claritin Liqui-Gel) 10 mg capsule Take 1 tablet by mouth once daily as needed (for allergy symptoms). 5/19/22   Historical Provider, MD   losartan-hydrochlorothiazide (Hyzaar) 50-12.5 mg tablet Take 1 tablet by mouth once daily. as directed 11/1/23   Guanako Castillo PA-C   multivitamin tablet Take 1 tablet by mouth once daily. 5/16/22   Historical Provider, MD   omeprazole (PriLOSEC) 10 mg DR capsule Take 2 capsules (20 mg) by mouth once daily.    Historical Provider, MD   predniSONE (Deltasone) 10 mg tablet Take 1 tablet (10 mg) by mouth 2 times a day for 5 days. 12/26/23 12/31/23  Guanako Castillo PA-C   rizatriptan (Maxalt) 10 mg tablet Take 1 tablet (10 mg) by mouth 1 time if needed for migraine. May repeat in 2 hours if unresolved. Do not exceed 30 mg in 24 hours.  Patient taking differently: Take 1 tablet (10 mg) by mouth 1 time if needed for migraine. May repeat in 2 hours if unresolved. Do not exceed 30 mg in 24 hours.- still takes as needed 5/24/23 6/23/23  Mago Gifford DO   traMADol (Ultram) 50 mg tablet Take 1 tablet (50 mg) by mouth every 4 hours if needed for severe pain (7 - 10) for up to 3 days. 12/26/23 12/29/23  Guanako Castillo PA-C   valACYclovir (Valtrex) 1 gram tablet Take 1 tablet (1,000 mg) by mouth 3 times a day for 7 days. 12/26/23 1/2/24  Guanako Castillo PA-C   Bacillus subtilis-inulin 1.5 billion cell-1 gram tablet,chewable Chew. 5/19/22 1/4/24  Historical  "Provider, MD       Allergies:  Allergies   Allergen Reactions    Amoxicillin-Pot Clavulanate Unknown    Aspirin Unknown    Cefaclor Unknown    Gluten Other     Diarrhea, intolerance    Ibuprofen Unknown    Loracarbef Unknown    Milk Diarrhea    Tetanus Vaccines And Toxoid Unknown    Tree Nuts Diarrhea     peanuts    Aleve [Naproxen Sodium] Rash    Pepto-Bismol [Bismuth Subsalicylate] Rash     Family History:   Denies family history of peptic ulcer disease or GI malignancy.    Social History:  Non-smoker.  Alcohol use.  No drug use.    ROS:  Constitutional:  no fever, sweats, and chills  Cardiovascular: No chest pain  Respiratory: + COVID-19 infection  Gastrointestinal: + Upper abdominal pain, gluten sensitivity.  No hematemesis.  No gross blood in the stool.  No dark black bowel movements.   Genitourinary: no dysuria  Musculoskeletal: no weakness or swelling  Integumentary: + shingles  Neurological: no confusion  Endocrine: no heat or cold intolerance  Heme/Lymph: no easy bruising or bleeding    Objective:  /85 (BP Location: Left arm, Patient Position: Lying)   Pulse 83   Temp 36.4 °C (97.5 °F) (Temporal)   Resp 16   Ht 1.6 m (5' 3\")   Wt 78.4 kg (172 lb 13.5 oz)   SpO2 97%   BMI 30.62 kg/m²     Physical Exam:  Constitutional: No acute distress, conversant, pleasant  Neurologic: alert and oriented  Psych: appropriate affect  Ears, Nose, Mouth and Throat: mucus membranes moist  Pulmonary: No labored breathing  Cardiovascular: Regular rate and rhythm  Abdomen: soft, non-distended, non-tender, BMI 30  Musculoskeletal: Moves all extremities, no edema  Skin: No jaundice    Labs:  Hemoglobin 15.0 last night, 12.2 this morning    Imaging:  CT abdomen and pelvis reviewed: There is focal hyperdensity in the duodenum, could represent small bleeding ulcer.  Similar appearance of hyperdense material in the proximal jejunum.  Hyperdense ingested material could have a similar appearance. Repeat CT scan with oral " contrast did not show abnormality of the duodenum or proximal jejunum.  There was a moderate amount of fluid in the colon which could be consistent with viral enteritis.    Assessment and Plan: Sandra Platt is a 48 y.o. old female with upper abdominal pain.  I think this was likely secondary to consuming gluten when she has a known gluten sensitivity.  Her pain is resolved this morning.  She has had no evidence of bleeding.  She had a normal, nonbloody, nondark bowel movement early this morning.  I suspect what was seen on CT scan was ingested material, not a bleeding ulcer.  I suspect her hemoglobin drop was delusional.  However, I do recommend repeating this afternoon.  If vitals remained stable and hemoglobin relatively stable I think would be appropriate to discharge her home.  She is already on a PPI.  I think there is little downside to giving her a course of Carafate for the next week so that if this were an ulcer she would be treated.  If anything were to change, we will proceed with EGD.    Mago Moncada MD  1/4/2024

## 2024-01-04 NOTE — DISCHARGE SUMMARY
Discharge Diagnosis  Abdominal pain    Issues Requiring Follow-Up  Abdominal pain    Discharge Meds     Your medication list        START taking these medications        Instructions Last Dose Given Next Dose Due   sucralfate 100 mg/mL suspension  Commonly known as: Carafate      Take 10 mL (1 g) by mouth every 6 hours for 5 days.              CHANGE how you take these medications        Instructions Last Dose Given Next Dose Due   rizatriptan 10 mg tablet  Commonly known as: Maxalt  What changed: additional instructions      Take 1 tablet (10 mg) by mouth 1 time if needed for migraine. May repeat in 2 hours if unresolved. Do not exceed 30 mg in 24 hours.              CONTINUE taking these medications        Instructions Last Dose Given Next Dose Due   Claritin Liqui-Gel 10 mg capsule  Generic drug: loratadine           Emgality Pen 120 mg/mL auto-injector  Generic drug: galcanezumab      Inject 1 Syringe (120 mg) under the skin every 28 (twenty-eight) days.       Emgality Pen 120 mg/mL auto-injector  Generic drug: galcanezumab      Inject 1 Syringe (120 mg) under the skin every 30 (thirty) days.       gabapentin 100 mg capsule  Commonly known as: Neurontin      take 2 capsules by mouth three times a day       losartan-hydrochlorothiazide 50-12.5 mg tablet  Commonly known as: Hyzaar      Take 1 tablet by mouth once daily. as directed       multivitamin tablet           omeprazole 10 mg DR capsule  Commonly known as: PriLOSEC                  STOP taking these medications      predniSONE 10 mg tablet  Commonly known as: Deltasone        traMADol 50 mg tablet  Commonly known as: Ultram        valACYclovir 1 gram tablet  Commonly known as: Valtrex                  Where to Get Your Medications        These medications were sent to [id number] - [address] [address]      Phone: [phone]   sucralfate 100 mg/mL suspension         Test Results Pending At  Discharge  Pending Labs       Order Current Status    Extra Urine Gray Tube Collected (01/03/24 2128)    Endomysial antibody, IgA titer In process    IgA In process    Urinalysis with Reflex Culture and Microscopic In process            Hospital Course   Sandra Platt is a 48 y.o. female with a significant past medical history of undiagnosed gluten sensitivity disorder who presented to the ED with complaints of abdominal pain and diarrhea for the past 2 to 3 days.  Patient states she started having symptoms after she ate gluten containing bread which her boyfriend had mistakenly used to prepare a meal.  Since then she has had multiple episodes about 8 loose stools per day which consist of brown watery stool, nonbloody, she also reports vomiting clear liquids, no hematemesis.  She has had generalized abdominal pain.  Patient reports that she was diagnosed as being positive with COVID on 1/1/2024 right prior to the onset of her symptoms.  She has also been on antiviral for shingles since December.     Patient states she has self diagnosed gluten sensitivity disorder after she had about 50 pound weight loss about 10 years ago.  During that time there were multiple studies done, could not find the exact reason why she was losing weight.  She reports having an endoscopy that showed gastritis.  She reports that she started avoiding gluten and since then had diarrhea and weight loss stopped until most recently.  She was initially tachycardic on arrival, but heart rate has stabilized.  Blood pressure is stable.  Rest of her vitals are unremarkable at this time.  Her labs are also essentially unremarkable except for elevated CRP of 2.2.  Initial CT was not erroneously read as concerning for bleeding ulcer, but repeat CT with contrast was done and shows shows moderate amount of fluid throughout the nondilated colon which may be due to viral enterocolitis.    Patient was seen by general surgery, Dr. Moncada.  Lena that  her CT scan showed ingested material and not a bleeding ulcer.  Patient hemoglobin dropped during admission after fluid resuscitation.  Repeat hemoglobin was 12.1 with repeat potassium of 3.8 after 60 mill equivalents of potassium replacement.  Patient is in fair condition to discharge home.  Denies complaints of nausea, abdominal cramping, or abdominal pain.  Verbalizes her diarrhea has decreased.  Resume home meds.  E scribed to [facility] Carafate 10 mL every 6 hours x 1 week.  Follow-up with PCP in 1 week.  Recommend following up with GI.    Pertinent Physical Exam At Time of Discharge  Physical Exam  General Appearance: AAO x 3, not in acute distress  Skin: skin color pink, warm, and dry; no suspicious rashes or lesions  Eyes : PERRL, EOM's intact  ENT: mucous membranes pink and moist  Neck: normocephalic  Respiratory: lungs clear to auscultation anteriorly; no wheezing, rhonchi, or crackles.   Heart: regular rate and rhythm.   Abdomen: Nondistended, positive bowel sounds x4, soft,  nontender  Extremities: no edema   Peripheral pulses: normal x4 extremities  Neuro: alert, coherent and conversant, no focal motor deficits  Outpatient Follow-Up  Future Appointments   Date Time Provider Department Center   5/24/2024  1:00 PM Mago Gifford DO [id number]         CRYSTAL Bernardo-MAGDALENE

## 2024-01-05 ENCOUNTER — PATIENT OUTREACH (OUTPATIENT)
Dept: PRIMARY CARE | Facility: CLINIC | Age: 49
End: 2024-01-05
Payer: COMMERCIAL

## 2024-01-05 RX ORDER — PROMETHAZINE HYDROCHLORIDE 25 MG/1
25 SUPPOSITORY RECTAL EVERY 6 HOURS PRN
Qty: 12 EACH | Refills: 3 | Status: SHIPPED | OUTPATIENT
Start: 2024-01-05 | End: 2024-01-17

## 2024-01-05 RX ORDER — ONDANSETRON 4 MG/1
4 TABLET, ORALLY DISINTEGRATING ORAL EVERY 8 HOURS PRN
Qty: 20 TABLET | Refills: 3 | Status: SHIPPED | OUTPATIENT
Start: 2024-01-05 | End: 2024-02-01

## 2024-01-05 NOTE — PROGRESS NOTES
Discharge Facility: [facility]  Discharge Diagnosis: abdominal pain  Admission Date: 1/3/2024  Discharge Date: 1/4/2024    PCP Appointment Date: none  Specialist Appointment Date: none  Hospital Encounter and Summary: Linked   See discharge assessment below for further details    New meds: carafate    Engagement  Call Start Time: 1047 (1/5/2024 10:47 AM)    Medications  Medications reviewed with patient/caregiver?: Yes (1/5/2024 10:47 AM)  Is the patient having any side effects they believe may be caused by any medication additions or changes?: No (1/5/2024 10:47 AM)  Does the patient have all medications ordered at discharge?: Yes (1/5/2024 10:47 AM)  Care Management Interventions: No intervention needed (1/5/2024 10:47 AM)  Prescription Comments: new meds: carafate (1/5/2024 10:47 AM)  Is the patient taking all medications as directed (includes completed medication regime)?: Yes (1/5/2024 10:47 AM)  Medication Comments: see med list (1/5/2024 10:47 AM)    Appointments  Does the patient have a primary care provider?: Yes (1/5/2024 10:47 AM)  Care Management Interventions: Advised patient to make appointment (1/5/2024 10:47 AM)  Has the patient kept scheduled appointments due by today?: Yes (1/5/2024 10:47 AM)  Care Management Interventions: Advised patient to keep appointment (1/5/2024 10:47 AM)    Self Management  What is the home health agency?: none (1/5/2024 10:47 AM)  Has home health visited the patient within 72 hours of discharge?: Not applicable (1/5/2024 10:47 AM)    Patient Teaching  Does the patient have access to their discharge instructions?: Yes (1/5/2024 10:47 AM)  Care Management Interventions: Reviewed instructions with patient (1/5/2024 10:47 AM)  What is the patient's perception of their health status since discharge?: Improving (1/5/2024 10:47 AM)  Is the patient/caregiver able to teach back the hierarchy of who to call/visit for symptoms/problems? PCP, Specialist, Home Health nurse, Urgent Care, ED,  911: Yes (1/5/2024 10:47 AM)    Wrap Up  Wrap Up Additional Comments: patient admitted to [facility] on 1/3/2024 with nausea, vomiting and abdominal pain. Discharged 1/4/2024 with new meds: carafate. CTS spoke with patient and she stated that she was doing much better. Patient denies any neausea, vomiting and abdominal pain. Waiting on her prescription to be ready so she can go and . Patient does not have an appt with PCP as of yet. Will task office staff for help with this. No questions or concerns at this time. (1/5/2024 10:47 AM)  Call End Time: 1054 (1/5/2024 10:47 AM)

## 2024-01-06 LAB — ENDOMYSIUM IGA TITR SER IF: NORMAL {TITER}

## 2024-01-07 DIAGNOSIS — R10.84 ABDOMINAL PAIN, GENERALIZED: ICD-10-CM

## 2024-01-07 RX ORDER — SUCRALFATE 1 G/10ML
SUSPENSION ORAL
Qty: 200 ML | Refills: 0 | Status: SHIPPED | OUTPATIENT
Start: 2024-01-07

## 2024-01-18 ENCOUNTER — PATIENT OUTREACH (OUTPATIENT)
Dept: PRIMARY CARE | Facility: CLINIC | Age: 49
End: 2024-01-18
Payer: COMMERCIAL

## 2024-02-22 ENCOUNTER — PATIENT OUTREACH (OUTPATIENT)
Dept: PRIMARY CARE | Facility: CLINIC | Age: 49
End: 2024-02-22
Payer: COMMERCIAL

## 2024-03-18 ENCOUNTER — PATIENT OUTREACH (OUTPATIENT)
Dept: PRIMARY CARE | Facility: CLINIC | Age: 49
End: 2024-03-18
Payer: COMMERCIAL

## 2024-04-01 DIAGNOSIS — G43.709 CHRONIC MIGRAINE WITHOUT AURA WITHOUT STATUS MIGRAINOSUS, NOT INTRACTABLE: ICD-10-CM

## 2024-04-01 RX ORDER — GABAPENTIN 100 MG/1
200 CAPSULE ORAL 3 TIMES DAILY
Qty: 180 CAPSULE | Refills: 2 | Status: SHIPPED | OUTPATIENT
Start: 2024-04-01

## 2024-05-24 ENCOUNTER — OFFICE VISIT (OUTPATIENT)
Dept: PRIMARY CARE | Facility: CLINIC | Age: 49
End: 2024-05-24
Payer: COMMERCIAL

## 2024-05-24 VITALS
BODY MASS INDEX: 30.48 KG/M2 | DIASTOLIC BLOOD PRESSURE: 77 MMHG | WEIGHT: 172 LBS | HEIGHT: 63 IN | SYSTOLIC BLOOD PRESSURE: 122 MMHG | HEART RATE: 90 BPM

## 2024-05-24 DIAGNOSIS — J30.2 SEASONAL ALLERGIES: ICD-10-CM

## 2024-05-24 DIAGNOSIS — Z00.00 WELLNESS EXAMINATION: ICD-10-CM

## 2024-05-24 DIAGNOSIS — Z12.31 SCREENING MAMMOGRAM FOR BREAST CANCER: ICD-10-CM

## 2024-05-24 DIAGNOSIS — G43.709 CHRONIC MIGRAINE WITHOUT AURA WITHOUT STATUS MIGRAINOSUS, NOT INTRACTABLE: Primary | ICD-10-CM

## 2024-05-24 DIAGNOSIS — F41.9 ANXIETY: ICD-10-CM

## 2024-05-24 PROCEDURE — 99214 OFFICE O/P EST MOD 30 MIN: CPT | Performed by: INTERNAL MEDICINE

## 2024-05-24 PROCEDURE — 3078F DIAST BP <80 MM HG: CPT | Performed by: INTERNAL MEDICINE

## 2024-05-24 PROCEDURE — 3074F SYST BP LT 130 MM HG: CPT | Performed by: INTERNAL MEDICINE

## 2024-05-24 PROCEDURE — 1036F TOBACCO NON-USER: CPT | Performed by: INTERNAL MEDICINE

## 2024-05-24 RX ORDER — BUPROPION HYDROCHLORIDE 150 MG/1
150 TABLET ORAL EVERY MORNING
Qty: 30 TABLET | Refills: 5 | Status: SHIPPED | OUTPATIENT
Start: 2024-05-24 | End: 2024-11-20

## 2024-05-24 RX ORDER — ALBUTEROL SULFATE 90 UG/1
2 AEROSOL, METERED RESPIRATORY (INHALATION) EVERY 4 HOURS PRN
Qty: 8 G | Refills: 5 | Status: SHIPPED | OUTPATIENT
Start: 2024-05-24 | End: 2025-05-24

## 2024-05-24 RX ORDER — EPINEPHRINE 0.3 MG/.3ML
INJECTION SUBCUTANEOUS
COMMUNITY
Start: 2023-08-18

## 2024-05-24 ASSESSMENT — ENCOUNTER SYMPTOMS
SINUS PAIN: 0
VOMITING: 0
ACTIVITY CHANGE: 0
CHILLS: 0
ABDOMINAL DISTENTION: 0
WEAKNESS: 0
NAUSEA: 0
SHORTNESS OF BREATH: 0
WHEEZING: 0
COUGH: 0
DIARRHEA: 0
NUMBNESS: 0
SORE THROAT: 0
SINUS PRESSURE: 0
BACK PAIN: 0
APPETITE CHANGE: 0
FATIGUE: 0

## 2024-05-24 NOTE — PROGRESS NOTES
"Subjective   Patient ID: Sandra Platt is a 49 y.o. female who presents for Annual Exam.  HPI  Patient is here today for annual visit,.     Pt reports that she has been doing well.     Review of Systems   Constitutional:  Negative for activity change, appetite change, chills and fatigue.   HENT:  Negative for congestion, postnasal drip, sinus pressure, sinus pain and sore throat.    Respiratory:  Negative for cough, shortness of breath and wheezing.    Cardiovascular:  Negative for chest pain and leg swelling.   Gastrointestinal:  Negative for abdominal distention, diarrhea, nausea and vomiting.   Musculoskeletal:  Negative for back pain.   Neurological:  Negative for weakness and numbness.       Objective   /77   Pulse 90   Ht 1.6 m (5' 3\")   Wt 78 kg (172 lb)   BMI 30.47 kg/m²     Physical Exam  Constitutional:       General: She is not in acute distress.     Appearance: Normal appearance.   HENT:      Head: Normocephalic.      Right Ear: Tympanic membrane, ear canal and external ear normal.      Left Ear: Tympanic membrane, ear canal and external ear normal.      Nose: Nose normal.      Mouth/Throat:      Pharynx: No oropharyngeal exudate.   Eyes:      General:         Right eye: No discharge.         Left eye: No discharge.      Extraocular Movements: Extraocular movements intact.      Pupils: Pupils are equal, round, and reactive to light.   Cardiovascular:      Rate and Rhythm: Normal rate and regular rhythm.      Heart sounds: No murmur heard.     No gallop.   Pulmonary:      Effort: Pulmonary effort is normal. No respiratory distress.      Breath sounds: Normal breath sounds. No wheezing.   Abdominal:      General: Bowel sounds are normal. There is no distension.      Palpations: Abdomen is soft.      Tenderness: There is no abdominal tenderness.   Musculoskeletal:         General: No swelling. Normal range of motion.      Cervical back: Neck supple. No tenderness.   Skin:     General: Skin " is warm and dry.      Coloration: Skin is not jaundiced.   Neurological:      General: No focal deficit present.      Mental Status: She is alert and oriented to person, place, and time.      Cranial Nerves: No cranial nerve deficit.   Psychiatric:         Mood and Affect: Mood normal.         Behavior: Behavior normal.           Assessment/Plan   Problem List Items Addressed This Visit       Anxiety    Relevant Medications    buPROPion XL (Wellbutrin XL) 150 mg 24 hr tablet    Migraine headache - Primary    Relevant Medications    rimegepant (Nurtec ODT) 75 mg tablet,disintegrating    Seasonal allergies    Relevant Medications    albuterol (Ventolin HFA) 90 mcg/actuation inhaler     Other Visit Diagnoses       Screening mammogram for breast cancer        Relevant Orders    BI mammo bilateral screening tomosynthesis    Wellness examination        Relevant Orders    Lipid Panel    Comprehensive Metabolic Panel    CBC and Auto Differential    TSH with reflex to Free T4 if abnormal    Hemoglobin A1C          Immunizations:  Influenza Vaccine: declines   Prevnar 13 Vaccine --   Pneumovax 23 Vaccine --  Shingrix Vaccine: --   COVID: 2      Health Maintenance:  Breast Cancer screenin, , will order    Bone Density: n/a   Cervical Cancer Screening: overdue   Colon Cancer Screenin     1. HTN , controlled   - continue losartan-hctz 50-12.5 mg po daily    - will order cmp, lipid      2. Anxiety, controlled   - wants to resume wellbutrin, sent rx      3. Migraines , controlled   - still getting about 15 migraines a month even with emgality   - Emgality q mo (refilled)   - maxalt prn    - will see if nurtec is covered or ubrelvy     4. GERD   - continue omeprazole 20mg po daily      5. Will order mammo     Final diagnoses:   [G43.709] Chronic migraine without aura without status migrainosus, not intractable   [J30.2] Seasonal allergies   [Z12.31] Screening mammogram for breast cancer   [Z00.00] Wellness  examination   [F41.9] Anxiety

## 2024-05-30 ENCOUNTER — LAB (OUTPATIENT)
Dept: LAB | Facility: LAB | Age: 49
End: 2024-05-30
Payer: COMMERCIAL

## 2024-05-30 DIAGNOSIS — Z00.00 WELLNESS EXAMINATION: ICD-10-CM

## 2024-05-30 LAB
ALBUMIN SERPL BCP-MCNC: 4.2 G/DL (ref 3.4–5)
ALP SERPL-CCNC: 42 U/L (ref 33–110)
ALT SERPL W P-5'-P-CCNC: 13 U/L (ref 7–45)
ANION GAP SERPL CALC-SCNC: 11 MMOL/L (ref 10–20)
AST SERPL W P-5'-P-CCNC: 12 U/L (ref 9–39)
BASOPHILS # BLD AUTO: 0.02 X10*3/UL (ref 0–0.1)
BASOPHILS NFR BLD AUTO: 0.3 %
BILIRUB SERPL-MCNC: 0.4 MG/DL (ref 0–1.2)
BUN SERPL-MCNC: 11 MG/DL (ref 6–23)
CALCIUM SERPL-MCNC: 9.1 MG/DL (ref 8.6–10.3)
CHLORIDE SERPL-SCNC: 104 MMOL/L (ref 98–107)
CHOLEST SERPL-MCNC: 124 MG/DL (ref 0–199)
CHOLESTEROL/HDL RATIO: 3
CO2 SERPL-SCNC: 29 MMOL/L (ref 21–32)
CREAT SERPL-MCNC: 0.65 MG/DL (ref 0.5–1.05)
EGFRCR SERPLBLD CKD-EPI 2021: >90 ML/MIN/1.73M*2
EOSINOPHIL # BLD AUTO: 0.12 X10*3/UL (ref 0–0.7)
EOSINOPHIL NFR BLD AUTO: 1.9 %
ERYTHROCYTE [DISTWIDTH] IN BLOOD BY AUTOMATED COUNT: 12.7 % (ref 11.5–14.5)
EST. AVERAGE GLUCOSE BLD GHB EST-MCNC: 103 MG/DL
GLUCOSE SERPL-MCNC: 94 MG/DL (ref 74–99)
HBA1C MFR BLD: 5.2 %
HCT VFR BLD AUTO: 39.1 % (ref 36–46)
HDLC SERPL-MCNC: 42 MG/DL
HGB BLD-MCNC: 12.4 G/DL (ref 12–16)
IMM GRANULOCYTES # BLD AUTO: 0.02 X10*3/UL (ref 0–0.7)
IMM GRANULOCYTES NFR BLD AUTO: 0.3 % (ref 0–0.9)
LDLC SERPL CALC-MCNC: 63 MG/DL
LYMPHOCYTES # BLD AUTO: 2.33 X10*3/UL (ref 1.2–4.8)
LYMPHOCYTES NFR BLD AUTO: 37.6 %
MCH RBC QN AUTO: 30.3 PG (ref 26–34)
MCHC RBC AUTO-ENTMCNC: 31.7 G/DL (ref 32–36)
MCV RBC AUTO: 96 FL (ref 80–100)
MONOCYTES # BLD AUTO: 0.48 X10*3/UL (ref 0.1–1)
MONOCYTES NFR BLD AUTO: 7.7 %
NEUTROPHILS # BLD AUTO: 3.23 X10*3/UL (ref 1.2–7.7)
NEUTROPHILS NFR BLD AUTO: 52.2 %
NON HDL CHOLESTEROL: 82 MG/DL (ref 0–149)
NRBC BLD-RTO: 0 /100 WBCS (ref 0–0)
PLATELET # BLD AUTO: 188 X10*3/UL (ref 150–450)
POTASSIUM SERPL-SCNC: 3.9 MMOL/L (ref 3.5–5.3)
PROT SERPL-MCNC: 6.4 G/DL (ref 6.4–8.2)
RBC # BLD AUTO: 4.09 X10*6/UL (ref 4–5.2)
SODIUM SERPL-SCNC: 140 MMOL/L (ref 136–145)
TRIGL SERPL-MCNC: 93 MG/DL (ref 0–149)
TSH SERPL-ACNC: 2.37 MIU/L (ref 0.44–3.98)
VLDL: 19 MG/DL (ref 0–40)
WBC # BLD AUTO: 6.2 X10*3/UL (ref 4.4–11.3)

## 2024-05-30 PROCEDURE — 83036 HEMOGLOBIN GLYCOSYLATED A1C: CPT

## 2024-05-30 PROCEDURE — 80053 COMPREHEN METABOLIC PANEL: CPT

## 2024-05-30 PROCEDURE — 84443 ASSAY THYROID STIM HORMONE: CPT

## 2024-05-30 PROCEDURE — 80061 LIPID PANEL: CPT

## 2024-05-30 PROCEDURE — 85025 COMPLETE CBC W/AUTO DIFF WBC: CPT

## 2024-05-30 PROCEDURE — 36415 COLL VENOUS BLD VENIPUNCTURE: CPT

## 2024-05-31 DIAGNOSIS — G43.709 CHRONIC MIGRAINE WITHOUT AURA WITHOUT STATUS MIGRAINOSUS, NOT INTRACTABLE: ICD-10-CM

## 2024-05-31 RX ORDER — GALCANEZUMAB 120 MG/ML
120 INJECTION, SOLUTION SUBCUTANEOUS
Qty: 1 EACH | Refills: 11 | Status: SHIPPED | OUTPATIENT
Start: 2024-05-31

## 2024-06-14 ENCOUNTER — HOSPITAL ENCOUNTER (OUTPATIENT)
Dept: RADIOLOGY | Facility: CLINIC | Age: 49
Discharge: HOME | End: 2024-06-14
Payer: COMMERCIAL

## 2024-06-14 VITALS — BODY MASS INDEX: 30.47 KG/M2 | HEIGHT: 63 IN | WEIGHT: 171.98 LBS

## 2024-06-14 DIAGNOSIS — Z12.31 SCREENING MAMMOGRAM FOR BREAST CANCER: ICD-10-CM

## 2024-06-14 PROCEDURE — 77067 SCR MAMMO BI INCL CAD: CPT

## 2024-06-18 DIAGNOSIS — G43.709 CHRONIC MIGRAINE WITHOUT AURA WITHOUT STATUS MIGRAINOSUS, NOT INTRACTABLE: ICD-10-CM

## 2024-06-18 RX ORDER — GALCANEZUMAB 120 MG/ML
120 INJECTION, SOLUTION SUBCUTANEOUS
Qty: 1 ML | Refills: 5 | Status: SHIPPED | OUTPATIENT
Start: 2024-06-18

## 2024-07-08 DIAGNOSIS — G43.709 CHRONIC MIGRAINE WITHOUT AURA WITHOUT STATUS MIGRAINOSUS, NOT INTRACTABLE: ICD-10-CM

## 2024-07-09 RX ORDER — GABAPENTIN 100 MG/1
200 CAPSULE ORAL 3 TIMES DAILY
Qty: 180 CAPSULE | Refills: 2 | Status: SHIPPED | OUTPATIENT
Start: 2024-07-09

## 2024-08-07 DIAGNOSIS — G43.709 CHRONIC MIGRAINE WITHOUT AURA WITHOUT STATUS MIGRAINOSUS, NOT INTRACTABLE: ICD-10-CM

## 2024-11-05 DIAGNOSIS — F41.9 ANXIETY: ICD-10-CM

## 2024-11-05 RX ORDER — BUPROPION HYDROCHLORIDE 150 MG/1
150 TABLET ORAL EVERY MORNING
Qty: 90 TABLET | Refills: 3 | Status: SHIPPED | OUTPATIENT
Start: 2024-11-05 | End: 2025-10-31

## 2024-11-19 DIAGNOSIS — I10 PRIMARY HYPERTENSION: ICD-10-CM

## 2024-11-19 RX ORDER — LOSARTAN POTASSIUM AND HYDROCHLOROTHIAZIDE 12.5; 5 MG/1; MG/1
1 TABLET ORAL DAILY
Qty: 90 TABLET | Refills: 3 | Status: SHIPPED | OUTPATIENT
Start: 2024-11-19

## 2024-12-03 DIAGNOSIS — G43.709 CHRONIC MIGRAINE WITHOUT AURA WITHOUT STATUS MIGRAINOSUS, NOT INTRACTABLE: ICD-10-CM

## 2024-12-03 RX ORDER — GABAPENTIN 100 MG/1
200 CAPSULE ORAL 3 TIMES DAILY
Qty: 180 CAPSULE | Refills: 2 | Status: SHIPPED | OUTPATIENT
Start: 2024-12-03

## 2025-03-14 ENCOUNTER — TELEPHONE (OUTPATIENT)
Dept: PRIMARY CARE | Facility: CLINIC | Age: 50
End: 2025-03-14
Payer: COMMERCIAL

## 2025-03-14 NOTE — TELEPHONE ENCOUNTER
Pt called requesting refill on gabapentin I told pt we could not refill this until she is a established pt with Dr. Jose bc we will need a UTOX and signed contract. Pt understood she has appt 3/20

## 2025-03-20 ENCOUNTER — APPOINTMENT (OUTPATIENT)
Dept: PRIMARY CARE | Facility: CLINIC | Age: 50
End: 2025-03-20
Payer: COMMERCIAL

## 2025-03-25 ENCOUNTER — APPOINTMENT (OUTPATIENT)
Dept: PRIMARY CARE | Facility: CLINIC | Age: 50
End: 2025-03-25
Payer: COMMERCIAL

## 2025-03-25 VITALS
WEIGHT: 175.8 LBS | HEART RATE: 72 BPM | HEIGHT: 63 IN | SYSTOLIC BLOOD PRESSURE: 131 MMHG | OXYGEN SATURATION: 97 % | DIASTOLIC BLOOD PRESSURE: 91 MMHG | BODY MASS INDEX: 31.15 KG/M2

## 2025-03-25 DIAGNOSIS — Z78.9 CONSUMES GLUTEN FREE DIET: ICD-10-CM

## 2025-03-25 DIAGNOSIS — R53.82 CHRONIC FATIGUE: Primary | ICD-10-CM

## 2025-03-25 DIAGNOSIS — F41.9 ANXIETY: ICD-10-CM

## 2025-03-25 DIAGNOSIS — E66.09 CLASS 1 OBESITY DUE TO EXCESS CALORIES WITH SERIOUS COMORBIDITY AND BODY MASS INDEX (BMI) OF 31.0 TO 31.9 IN ADULT: ICD-10-CM

## 2025-03-25 DIAGNOSIS — M25.50 MULTIPLE JOINT PAIN: ICD-10-CM

## 2025-03-25 DIAGNOSIS — I10 BENIGN ESSENTIAL HTN: ICD-10-CM

## 2025-03-25 DIAGNOSIS — E66.811 CLASS 1 OBESITY DUE TO EXCESS CALORIES WITH SERIOUS COMORBIDITY AND BODY MASS INDEX (BMI) OF 31.0 TO 31.9 IN ADULT: ICD-10-CM

## 2025-03-25 DIAGNOSIS — Z13.220 SCREENING FOR HYPERLIPIDEMIA: ICD-10-CM

## 2025-03-25 DIAGNOSIS — F51.01 PRIMARY INSOMNIA: ICD-10-CM

## 2025-03-25 DIAGNOSIS — G43.709 CHRONIC MIGRAINE WITHOUT AURA WITHOUT STATUS MIGRAINOSUS, NOT INTRACTABLE: ICD-10-CM

## 2025-03-25 DIAGNOSIS — F12.90 MARIJUANA USE: ICD-10-CM

## 2025-03-25 DIAGNOSIS — R52 GENERALIZED BODY ACHES: ICD-10-CM

## 2025-03-25 DIAGNOSIS — F32.A MODERATE DEPRESSIVE DISORDER: ICD-10-CM

## 2025-03-25 PROCEDURE — 3080F DIAST BP >= 90 MM HG: CPT | Performed by: INTERNAL MEDICINE

## 2025-03-25 PROCEDURE — 3075F SYST BP GE 130 - 139MM HG: CPT | Performed by: INTERNAL MEDICINE

## 2025-03-25 PROCEDURE — 99214 OFFICE O/P EST MOD 30 MIN: CPT | Performed by: INTERNAL MEDICINE

## 2025-03-25 PROCEDURE — 1036F TOBACCO NON-USER: CPT | Performed by: INTERNAL MEDICINE

## 2025-03-25 PROCEDURE — 3008F BODY MASS INDEX DOCD: CPT | Performed by: INTERNAL MEDICINE

## 2025-03-25 RX ORDER — BUPROPION HYDROCHLORIDE 150 MG/1
150 TABLET ORAL EVERY MORNING
Qty: 90 TABLET | Refills: 3 | Status: SHIPPED | OUTPATIENT
Start: 2025-03-25 | End: 2026-03-20

## 2025-03-25 RX ORDER — BUSPIRONE HYDROCHLORIDE 5 MG/1
5 TABLET ORAL 3 TIMES DAILY PRN
Qty: 60 TABLET | Refills: 11 | Status: SHIPPED | OUTPATIENT
Start: 2025-03-25 | End: 2026-03-25

## 2025-03-25 RX ORDER — RIZATRIPTAN BENZOATE 10 MG/1
10 TABLET ORAL 2 TIMES DAILY PRN
Qty: 9 TABLET | Refills: 11 | Status: SHIPPED | OUTPATIENT
Start: 2025-03-25

## 2025-03-25 RX ORDER — AMITRIPTYLINE HYDROCHLORIDE 25 MG/1
25 TABLET, FILM COATED ORAL NIGHTLY
Qty: 30 TABLET | Refills: 11 | Status: SHIPPED | OUTPATIENT
Start: 2025-03-25 | End: 2026-03-25

## 2025-03-25 RX ORDER — PREDNISONE 5 MG/1
5 TABLET ORAL DAILY PRN
Qty: 10 TABLET | Refills: 0 | Status: SHIPPED | OUTPATIENT
Start: 2025-03-25 | End: 2025-04-04

## 2025-03-25 ASSESSMENT — ENCOUNTER SYMPTOMS
DIARRHEA: 0
VOMITING: 0
COUGH: 0
GASTROINTESTINAL NEGATIVE: 1
LIGHT-HEADEDNESS: 0
ARTHRALGIAS: 1
CONSTIPATION: 0
AGITATION: 0
MYALGIAS: 1
ABDOMINAL PAIN: 0
NAUSEA: 0
DYSURIA: 0
EYES NEGATIVE: 1
DIZZINESS: 0
BACK PAIN: 0
SLEEP DISTURBANCE: 1
ENDOCRINE NEGATIVE: 1
PALPITATIONS: 0
CARDIOVASCULAR NEGATIVE: 1
CHILLS: 0
RHINORRHEA: 0
WHEEZING: 0
WEAKNESS: 0
ABDOMINAL DISTENTION: 0
FEVER: 0
FATIGUE: 1
DYSPHORIC MOOD: 1
NEUROLOGICAL NEGATIVE: 1
SHORTNESS OF BREATH: 0
HEADACHES: 0

## 2025-03-25 ASSESSMENT — PATIENT HEALTH QUESTIONNAIRE - PHQ9
2. FEELING DOWN, DEPRESSED OR HOPELESS: NOT AT ALL
1. LITTLE INTEREST OR PLEASURE IN DOING THINGS: NOT AT ALL
SUM OF ALL RESPONSES TO PHQ9 QUESTIONS 1 AND 2: 0

## 2025-03-25 NOTE — PROGRESS NOTES
Subjective   Patient ID: Sandra Platt is a 50 y.o. female who presents for New Patient Visit (New pt /Request NPV Dr. Gifford ).  HPI  NEW PT , MED REFILL. COMPLAINS OF GENERALIZED BODY ACHES AND MULTIPLE JOINT PAIN 5-6/10. CHRONIC MIGRAINE 6/10 WHICH HAS BEEN MORE FREQUENT (AROUND 10 TIMES PER MONTH). GABAPENTIN WAS GIVEN FOR PREVENTION OF MIGRIANE WHICH HELPED SOME, BUT HAS BEEN OUT FOR MORE THAN 2 WEEKS . HAS MULTIPLE FOOD ALLERGIES AND CONSUMES GLUTEN FREE/ DAIRY FREE DIET WHICH HELPS SOME. IS SUPPOSED TO SEE GYN FOR REMOVAL OF IUD (NOTICED MIGRAINE BECAME WORSE AFTER IUD INSERTION. CHRONIC FATIGUE, DEPRESSION/ ANXIETY, INSOMNIA (USES MARIJUANA /CANNABIS TO SLEEP). NO FAMILY H/O RHEUMATOLOGIC CONDITION.  Review of Systems   Constitutional:  Positive for fatigue. Negative for chills and fever.   HENT: Negative.  Negative for congestion, postnasal drip and rhinorrhea.    Eyes: Negative.  Negative for visual disturbance.   Respiratory:  Negative for cough, shortness of breath and wheezing.    Cardiovascular: Negative.  Negative for chest pain, palpitations and leg swelling.   Gastrointestinal: Negative.  Negative for abdominal distention, abdominal pain, constipation, diarrhea, nausea and vomiting.   Endocrine: Negative.    Genitourinary:  Negative for dysuria and urgency.   Musculoskeletal:  Positive for arthralgias and myalgias. Negative for back pain.   Skin: Negative.  Negative for rash.   Allergic/Immunologic: Negative for immunocompromised state.   Neurological: Negative.  Negative for dizziness, weakness, light-headedness and headaches.   Psychiatric/Behavioral:  Positive for dysphoric mood and sleep disturbance. Negative for agitation.        Objective   Physical Exam  Constitutional:       General: She is not in acute distress.  HENT:      Head: Normocephalic.      Nose: Nose normal.      Mouth/Throat:      Mouth: Mucous membranes are moist.   Eyes:      Conjunctiva/sclera: Conjunctivae normal.       Pupils: Pupils are equal, round, and reactive to light.   Cardiovascular:      Rate and Rhythm: Normal rate and regular rhythm.      Pulses: Normal pulses.      Heart sounds: Normal heart sounds.   Pulmonary:      Effort: No respiratory distress.      Breath sounds: No wheezing.   Chest:      Chest wall: No tenderness.   Abdominal:      General: Abdomen is flat. Bowel sounds are normal.      Palpations: Abdomen is soft.      Tenderness: There is no abdominal tenderness.   Musculoskeletal:         General: No tenderness. Normal range of motion.      Cervical back: Normal range of motion.   Lymphadenopathy:      Cervical: No cervical adenopathy.   Skin:     General: Skin is warm and dry.      Findings: No rash.   Neurological:      General: No focal deficit present.      Mental Status: She is alert. Mental status is at baseline.   Psychiatric:         Mood and Affect: Mood normal.         Behavior: Behavior normal.         Assessment/Plan   1. Chronic fatigue  Comprehensive Metabolic Panel    CBC and Auto Differential    Magnesium    Zinc    TSH with reflex to Free T4 if abnormal    Comprehensive Metabolic Panel    CBC and Auto Differential    Magnesium    Zinc    TSH with reflex to Free T4 if abnormal      2. Anxiety  buPROPion XL (Wellbutrin XL) 150 mg 24 hr tablet    amitriptyline (Elavil) 25 mg tablet    predniSONE (Deltasone) 5 mg tablet    busPIRone (Buspar) 5 mg tablet      3. Benign essential HTN  Comprehensive Metabolic Panel    Comprehensive Metabolic Panel      4. Screening for hyperlipidemia  Lipid Panel    Lipid Panel      5. Multiple joint pain  Sedimentation Rate    Anti-DNA Antibody, Double-Stranded    ABRIL    Rheumatoid Factor    C-reactive protein    Sedimentation Rate    Anti-DNA Antibody, Double-Stranded    ABRIL    Rheumatoid Factor    C-reactive protein      6. Chronic migraine without aura without status migrainosus, not intractable  amitriptyline (Elavil) 25 mg tablet    rizatriptan (Maxalt)  10 mg tablet      7. Primary insomnia  amitriptyline (Elavil) 25 mg tablet      8. Moderate depressive disorder  busPIRone (Buspar) 5 mg tablet      9. Class 1 obesity due to excess calories with serious comorbidity and body mass index (BMI) of 31.0 to 31.9 in adult        10. Generalized body aches        11. Consumes gluten free diet        12. Marijuana use        ALL ASSESSED CHRONIC CONDITIONS ARE STABLE OR ADDRESSED.  ADVISED FOR RELAXATION TECHNIQUES AND TO CUT DOWN ON CAFFEINE. WILL START BUSPIRONE 5 MG TID PRN AND ELAVIL 25 MG Q HS.  EXPLAINED THE DIFFERENTIAL DX OF CHRONIC FATIGUE.  ADVISED TO HAVE LOW FAT AND LOW CALORIE DIET AND TO LOOSE WEIGHT, DAILY EXERCISE.  ADVISED TO APPLY WARM COMPRESSION AND OTC PAIN CREAM PRN FOR PAIN. ADVISED TO START THE PREDNISONE AFTER DOING THE LAB.    HTN addressed as follow:    MONITOR BP   GOAL BP LOWER THAN 130/80  LOW SALT  EXERCISE DAILY    MDM    1) COMPLEXITY: 1 UNDIAGNOSED NEW PROBLEM WITH UNCERTAIN PROGNOSIS  2)DATA: TESTS INTERPRETED AND OR ORDERED, TOOK INDEPENDENT HISTORY OR RECORDS REVIEWED  3)RISK: MODERATE RISK DUE TO NATURE OF MEDICAL CONDITIONS/COMORBIDITY OR MEDICATIONS ORDERED OR SURGICAL OR PROCEDURE REFERRAL, .         2 weeks

## 2025-03-30 LAB
ALBUMIN SERPL-MCNC: 4.6 G/DL (ref 3.6–5.1)
ALP SERPL-CCNC: 45 U/L (ref 31–125)
ALT SERPL-CCNC: 12 U/L (ref 6–29)
ANA SER QL IF: NORMAL
ANION GAP SERPL CALCULATED.4IONS-SCNC: 8 MMOL/L (CALC) (ref 7–17)
AST SERPL-CCNC: 11 U/L (ref 10–35)
BASOPHILS # BLD AUTO: 22 CELLS/UL (ref 0–200)
BASOPHILS NFR BLD AUTO: 0.4 %
BILIRUB SERPL-MCNC: 0.3 MG/DL (ref 0.2–1.2)
BUN SERPL-MCNC: 15 MG/DL (ref 7–25)
CALCIUM SERPL-MCNC: 9.1 MG/DL (ref 8.6–10.2)
CHLORIDE SERPL-SCNC: 105 MMOL/L (ref 98–110)
CHOLEST SERPL-MCNC: 153 MG/DL
CHOLEST/HDLC SERPL: 2.7 (CALC)
CO2 SERPL-SCNC: 26 MMOL/L (ref 20–32)
CREAT SERPL-MCNC: 0.64 MG/DL (ref 0.5–0.99)
CRP SERPL-MCNC: NORMAL MG/L
DSDNA AB SER-ACNC: NORMAL [IU]/ML
EGFRCR SERPLBLD CKD-EPI 2021: 109 ML/MIN/1.73M2
EOSINOPHIL # BLD AUTO: 101 CELLS/UL (ref 15–500)
EOSINOPHIL NFR BLD AUTO: 1.8 %
ERYTHROCYTE [DISTWIDTH] IN BLOOD BY AUTOMATED COUNT: 13 % (ref 11–15)
ERYTHROCYTE [SEDIMENTATION RATE] IN BLOOD BY WESTERGREN METHOD: 2 MM/H
GLUCOSE SERPL-MCNC: 84 MG/DL (ref 65–99)
HCT VFR BLD AUTO: 45.8 % (ref 35–45)
HDLC SERPL-MCNC: 57 MG/DL
HGB BLD-MCNC: 14.3 G/DL (ref 11.7–15.5)
LDLC SERPL CALC-MCNC: 73 MG/DL (CALC)
LYMPHOCYTES # BLD AUTO: 2223 CELLS/UL (ref 850–3900)
LYMPHOCYTES NFR BLD AUTO: 39.7 %
MAGNESIUM SERPL-MCNC: 2 MG/DL (ref 1.5–2.5)
MCH RBC QN AUTO: 29.9 PG (ref 27–33)
MCHC RBC AUTO-ENTMCNC: 31.2 G/DL (ref 32–36)
MCV RBC AUTO: 95.8 FL (ref 80–100)
MONOCYTES # BLD AUTO: 353 CELLS/UL (ref 200–950)
MONOCYTES NFR BLD AUTO: 6.3 %
NEUTROPHILS # BLD AUTO: 2901 CELLS/UL (ref 1500–7800)
NEUTROPHILS NFR BLD AUTO: 51.8 %
NONHDLC SERPL-MCNC: 96 MG/DL (CALC)
PLATELET # BLD AUTO: 208 THOUSAND/UL (ref 140–400)
PMV BLD REES-ECKER: 12.6 FL (ref 7.5–12.5)
POTASSIUM SERPL-SCNC: 4.5 MMOL/L (ref 3.5–5.3)
PROT SERPL-MCNC: 7.1 G/DL (ref 6.1–8.1)
RBC # BLD AUTO: 4.78 MILLION/UL (ref 3.8–5.1)
RHEUMATOID FACT SERPL-ACNC: NORMAL [IU]/ML
SODIUM SERPL-SCNC: 139 MMOL/L (ref 135–146)
TRIGL SERPL-MCNC: 146 MG/DL
TSH SERPL-ACNC: 1.28 MIU/L
WBC # BLD AUTO: 5.6 THOUSAND/UL (ref 3.8–10.8)
ZINC SERPL-MCNC: NORMAL UG/ML

## 2025-04-01 LAB
ALBUMIN SERPL-MCNC: 4.6 G/DL (ref 3.6–5.1)
ALP SERPL-CCNC: 45 U/L (ref 31–125)
ALT SERPL-CCNC: 12 U/L (ref 6–29)
ANA SER QL IF: NEGATIVE
ANION GAP SERPL CALCULATED.4IONS-SCNC: 8 MMOL/L (CALC) (ref 7–17)
AST SERPL-CCNC: 11 U/L (ref 10–35)
BASOPHILS # BLD AUTO: 22 CELLS/UL (ref 0–200)
BASOPHILS NFR BLD AUTO: 0.4 %
BILIRUB SERPL-MCNC: 0.3 MG/DL (ref 0.2–1.2)
BUN SERPL-MCNC: 15 MG/DL (ref 7–25)
CALCIUM SERPL-MCNC: 9.1 MG/DL (ref 8.6–10.2)
CHLORIDE SERPL-SCNC: 105 MMOL/L (ref 98–110)
CHOLEST SERPL-MCNC: 153 MG/DL
CHOLEST/HDLC SERPL: 2.7 (CALC)
CO2 SERPL-SCNC: 26 MMOL/L (ref 20–32)
CREAT SERPL-MCNC: 0.64 MG/DL (ref 0.5–0.99)
CRP SERPL-MCNC: <3 MG/L
DSDNA AB SER-ACNC: 1 IU/ML
EGFRCR SERPLBLD CKD-EPI 2021: 109 ML/MIN/1.73M2
EOSINOPHIL # BLD AUTO: 101 CELLS/UL (ref 15–500)
EOSINOPHIL NFR BLD AUTO: 1.8 %
ERYTHROCYTE [DISTWIDTH] IN BLOOD BY AUTOMATED COUNT: 13 % (ref 11–15)
ERYTHROCYTE [SEDIMENTATION RATE] IN BLOOD BY WESTERGREN METHOD: 2 MM/H
GLUCOSE SERPL-MCNC: 84 MG/DL (ref 65–99)
HCT VFR BLD AUTO: 45.8 % (ref 35–45)
HDLC SERPL-MCNC: 57 MG/DL
HGB BLD-MCNC: 14.3 G/DL (ref 11.7–15.5)
LDLC SERPL CALC-MCNC: 73 MG/DL (CALC)
LYMPHOCYTES # BLD AUTO: 2223 CELLS/UL (ref 850–3900)
LYMPHOCYTES NFR BLD AUTO: 39.7 %
MAGNESIUM SERPL-MCNC: 2 MG/DL (ref 1.5–2.5)
MCH RBC QN AUTO: 29.9 PG (ref 27–33)
MCHC RBC AUTO-ENTMCNC: 31.2 G/DL (ref 32–36)
MCV RBC AUTO: 95.8 FL (ref 80–100)
MONOCYTES # BLD AUTO: 353 CELLS/UL (ref 200–950)
MONOCYTES NFR BLD AUTO: 6.3 %
NEUTROPHILS # BLD AUTO: 2901 CELLS/UL (ref 1500–7800)
NEUTROPHILS NFR BLD AUTO: 51.8 %
NONHDLC SERPL-MCNC: 96 MG/DL (CALC)
PLATELET # BLD AUTO: 208 THOUSAND/UL (ref 140–400)
PMV BLD REES-ECKER: 12.6 FL (ref 7.5–12.5)
POTASSIUM SERPL-SCNC: 4.5 MMOL/L (ref 3.5–5.3)
PROT SERPL-MCNC: 7.1 G/DL (ref 6.1–8.1)
RBC # BLD AUTO: 4.78 MILLION/UL (ref 3.8–5.1)
RHEUMATOID FACT SERPL-ACNC: <10 IU/ML
SODIUM SERPL-SCNC: 139 MMOL/L (ref 135–146)
TRIGL SERPL-MCNC: 146 MG/DL
TSH SERPL-ACNC: 1.28 MIU/L
WBC # BLD AUTO: 5.6 THOUSAND/UL (ref 3.8–10.8)
ZINC SERPL-MCNC: 104 MCG/DL (ref 60–130)

## 2025-04-03 ENCOUNTER — APPOINTMENT (OUTPATIENT)
Facility: CLINIC | Age: 50
End: 2025-04-03
Payer: COMMERCIAL

## 2025-04-03 VITALS
WEIGHT: 174.2 LBS | BODY MASS INDEX: 30.87 KG/M2 | DIASTOLIC BLOOD PRESSURE: 84 MMHG | SYSTOLIC BLOOD PRESSURE: 120 MMHG | HEIGHT: 63 IN

## 2025-04-03 DIAGNOSIS — Z01.419 ENCOUNTER FOR GYNECOLOGICAL EXAMINATION WITHOUT ABNORMAL FINDING: ICD-10-CM

## 2025-04-03 DIAGNOSIS — Z12.31 ENCOUNTER FOR SCREENING MAMMOGRAM FOR MALIGNANT NEOPLASM OF BREAST: ICD-10-CM

## 2025-04-03 PROCEDURE — 1036F TOBACCO NON-USER: CPT | Performed by: REGISTERED NURSE

## 2025-04-03 PROCEDURE — 3074F SYST BP LT 130 MM HG: CPT | Performed by: REGISTERED NURSE

## 2025-04-03 PROCEDURE — 3079F DIAST BP 80-89 MM HG: CPT | Performed by: REGISTERED NURSE

## 2025-04-03 PROCEDURE — 99386 PREV VISIT NEW AGE 40-64: CPT | Performed by: REGISTERED NURSE

## 2025-04-03 PROCEDURE — 3008F BODY MASS INDEX DOCD: CPT | Performed by: REGISTERED NURSE

## 2025-04-03 ASSESSMENT — ANXIETY QUESTIONNAIRES
3. WORRYING TOO MUCH ABOUT DIFFERENT THINGS: NOT AT ALL
IF YOU CHECKED OFF ANY PROBLEMS ON THIS QUESTIONNAIRE, HOW DIFFICULT HAVE THESE PROBLEMS MADE IT FOR YOU TO DO YOUR WORK, TAKE CARE OF THINGS AT HOME, OR GET ALONG WITH OTHER PEOPLE: NOT DIFFICULT AT ALL
1. FEELING NERVOUS, ANXIOUS, OR ON EDGE: NOT AT ALL
2. NOT BEING ABLE TO STOP OR CONTROL WORRYING: NOT AT ALL
7. FEELING AFRAID AS IF SOMETHING AWFUL MIGHT HAPPEN: NOT AT ALL
5. BEING SO RESTLESS THAT IT IS HARD TO SIT STILL: NOT AT ALL
6. BECOMING EASILY ANNOYED OR IRRITABLE: NOT AT ALL
GAD7 TOTAL SCORE: 0
4. TROUBLE RELAXING: NOT AT ALL

## 2025-04-03 ASSESSMENT — ENCOUNTER SYMPTOMS
PSYCHIATRIC NEGATIVE: 1
CONSTITUTIONAL NEGATIVE: 1

## 2025-04-03 ASSESSMENT — COLUMBIA-SUICIDE SEVERITY RATING SCALE - C-SSRS
1. IN THE PAST MONTH, HAVE YOU WISHED YOU WERE DEAD OR WISHED YOU COULD GO TO SLEEP AND NOT WAKE UP?: NO
6. HAVE YOU EVER DONE ANYTHING, STARTED TO DO ANYTHING, OR PREPARED TO DO ANYTHING TO END YOUR LIFE?: NO
2. HAVE YOU ACTUALLY HAD ANY THOUGHTS OF KILLING YOURSELF?: NO

## 2025-04-03 NOTE — PROGRESS NOTES
Subjective   Patient ID: Sandra Platt is a 50 y.o. female who presents for New Patient Visit (Np yearly visit. LMP-3/11/2025. PT has IUD and it is due to be removed in 3/26 pt has no concerns at this time.  Last pap 2021 last mammogram 6/2024).  HPI  New pt. Presents to [facility] care. Last pap 2021, Mirena placed in 2021. Mom had breast cancer with negative genetic testing per pt., half sister on dad's side had breast cancer, not sure about genetic testing. Boyfriend planning vasectomy. Pt. Denies any complaints or concerns at this time  Review of Systems   Constitutional: Negative.    Genitourinary: Negative.    Psychiatric/Behavioral: Negative.         Objective   Physical Exam  Constitutional:       Appearance: Normal appearance.   Pulmonary:      Effort: Pulmonary effort is normal.   Neurological:      General: No focal deficit present.      Mental Status: She is alert and oriented to person, place, and time.   Psychiatric:         Mood and Affect: Mood normal.         Behavior: Behavior normal.         Thought Content: Thought content normal.         Judgment: Judgment normal.         Assessment/Plan        Schedule mammogram  RTO 1 year and PRN    Sammie Arellano, APRN-CNM, APRN-CNP, DNP 04/03/25 1:30 PM

## 2025-04-08 ENCOUNTER — APPOINTMENT (OUTPATIENT)
Dept: PRIMARY CARE | Facility: CLINIC | Age: 50
End: 2025-04-08
Payer: COMMERCIAL

## 2025-04-08 VITALS
HEART RATE: 86 BPM | HEIGHT: 63 IN | WEIGHT: 177.3 LBS | SYSTOLIC BLOOD PRESSURE: 130 MMHG | DIASTOLIC BLOOD PRESSURE: 84 MMHG | BODY MASS INDEX: 31.41 KG/M2

## 2025-04-08 DIAGNOSIS — M79.7 FIBROMYALGIA: Primary | ICD-10-CM

## 2025-04-08 DIAGNOSIS — G43.709 CHRONIC MIGRAINE WITHOUT AURA WITHOUT STATUS MIGRAINOSUS, NOT INTRACTABLE: ICD-10-CM

## 2025-04-08 DIAGNOSIS — I10 BENIGN ESSENTIAL HTN: ICD-10-CM

## 2025-04-08 PROCEDURE — 99214 OFFICE O/P EST MOD 30 MIN: CPT | Performed by: INTERNAL MEDICINE

## 2025-04-08 PROCEDURE — 1036F TOBACCO NON-USER: CPT | Performed by: INTERNAL MEDICINE

## 2025-04-08 PROCEDURE — 3075F SYST BP GE 130 - 139MM HG: CPT | Performed by: INTERNAL MEDICINE

## 2025-04-08 PROCEDURE — 3008F BODY MASS INDEX DOCD: CPT | Performed by: INTERNAL MEDICINE

## 2025-04-08 PROCEDURE — 3079F DIAST BP 80-89 MM HG: CPT | Performed by: INTERNAL MEDICINE

## 2025-04-08 ASSESSMENT — ENCOUNTER SYMPTOMS
RHINORRHEA: 0
WEAKNESS: 0
DIZZINESS: 0
COUGH: 0
ABDOMINAL PAIN: 0
AGITATION: 0
CONSTIPATION: 0
PSYCHIATRIC NEGATIVE: 1
MUSCULOSKELETAL NEGATIVE: 1
CHILLS: 0
BACK PAIN: 0
NAUSEA: 0
DIARRHEA: 0
WHEEZING: 0
CARDIOVASCULAR NEGATIVE: 1
GASTROINTESTINAL NEGATIVE: 1
VOMITING: 0
SHORTNESS OF BREATH: 0
FEVER: 0
LIGHT-HEADEDNESS: 0
EYES NEGATIVE: 1
HEADACHES: 0
DYSURIA: 0
NEUROLOGICAL NEGATIVE: 1
PALPITATIONS: 0
ABDOMINAL DISTENTION: 0
ENDOCRINE NEGATIVE: 1

## 2025-04-08 NOTE — PROGRESS NOTES
Subjective   Patient ID: Sandra Platt is a 50 y.o. female who presents for Follow-up (2 WK F/U WITH LABS).  HPI  LAB F/U, MED REFILL. . FEELS MUCH BETTER COMPARED TO LAST VISIT . AMITRIPTYLINE HELPS WITH INSOMNIA AND PREVENTION OF MIGRAINE AND WITH BODY ACHES /JOINT PAIN. DIDN'T NEED TO TAKE THE PRN MAXALT. DEPRESSION/ ANXIETY ARE IMPROVING.  Review of Systems   Constitutional:  Negative for chills and fever.   HENT: Negative.  Negative for congestion, postnasal drip and rhinorrhea.    Eyes: Negative.  Negative for visual disturbance.   Respiratory:  Negative for cough, shortness of breath and wheezing.    Cardiovascular: Negative.  Negative for chest pain, palpitations and leg swelling.   Gastrointestinal: Negative.  Negative for abdominal distention, abdominal pain, constipation, diarrhea, nausea and vomiting.   Endocrine: Negative.    Genitourinary:  Negative for dysuria and urgency.   Musculoskeletal: Negative.  Negative for back pain.   Skin: Negative.  Negative for rash.   Allergic/Immunologic: Negative for immunocompromised state.   Neurological: Negative.  Negative for dizziness, weakness, light-headedness and headaches.   Psychiatric/Behavioral: Negative.  Negative for agitation.        Objective   Physical Exam  Constitutional:       General: She is not in acute distress.  HENT:      Head: Normocephalic.      Nose: Nose normal.      Mouth/Throat:      Mouth: Mucous membranes are moist.   Eyes:      Conjunctiva/sclera: Conjunctivae normal.      Pupils: Pupils are equal, round, and reactive to light.   Cardiovascular:      Rate and Rhythm: Normal rate and regular rhythm.      Pulses: Normal pulses.      Heart sounds: Normal heart sounds.   Pulmonary:      Effort: No respiratory distress.      Breath sounds: No wheezing.   Chest:      Chest wall: No tenderness.   Abdominal:      General: Abdomen is flat. Bowel sounds are normal.      Palpations: Abdomen is soft.      Tenderness: There is no abdominal  tenderness.   Musculoskeletal:         General: No tenderness. Normal range of motion.      Cervical back: Normal range of motion.   Lymphadenopathy:      Cervical: No cervical adenopathy.   Skin:     General: Skin is warm and dry.      Findings: No rash.   Neurological:      General: No focal deficit present.      Mental Status: She is alert. Mental status is at baseline.   Psychiatric:         Mood and Affect: Mood normal.         Behavior: Behavior normal.         Assessment/Plan   1. Fibromyalgia        2. Chronic migraine without aura without status migrainosus, not intractable  rimegepant (Nurtec ODT) 75 mg tablet,disintegrating      3. Benign essential HTN        TEST RESULTS WERE DISCUSSED.  ADVISED TO APPLY WARM COMPRESSION AND OTC PAIN CREAM PRN FOR PAIN. EXPLAINED THE DX OF FIBROMYALGIA ,ADVISED TO CONTINUE THE ELAVIL WHICH HELPS WITH MIGRAINE PREVENTION, INSOMNIA , DEPRESSION/ANXIETY AND FIBROMYALGIA.  ADVISED TO HAVE LOW FAT AND LOW CALORIE DIET AND TO LOOSE WEIGHT, DAILY EXERCISE.    HTN addressed as follow:    MONITOR BP   GOAL BP LOWER THAN 130/80  LOW SALT  EXERCISE DAILY.  ADVISED FOR RELAXATION TECHNIQUES AND TO CUT DOWN ON CAFFEINE.      MDM    1) COMPLEXITY: MORE THAN 1 STABLE CHRONIC CONDITION ADDRESSED  2)DATA: TESTS INTERPRETED AND OR ORDERED, TOOK INDEPENDENT HISTORY OR RECORDS REVIEWED  3)RISK: MODERATE RISK DUE TO NATURE OF MEDICAL CONDITIONS/COMORBIDITY OR MEDICATIONS ORDERED OR SURGICAL OR PROCEDURE REFERRAL, .     3 mon.

## 2025-04-28 DIAGNOSIS — M25.50 MULTIPLE JOINT PAIN: Primary | ICD-10-CM

## 2025-04-28 DIAGNOSIS — M79.7 FIBROMYALGIA: ICD-10-CM

## 2025-04-28 RX ORDER — PREDNISONE 5 MG/1
5 TABLET ORAL DAILY
COMMUNITY
End: 2025-04-28 | Stop reason: SDUPTHER

## 2025-04-28 RX ORDER — PREDNISONE 5 MG/1
5 TABLET ORAL DAILY
Qty: 21 TABLET | Refills: 0 | Status: SHIPPED | OUTPATIENT
Start: 2025-04-28

## 2025-04-28 NOTE — TELEPHONE ENCOUNTER
PT IS ASKING FOR A SHORT TERM SUPPLY I TOLD PT I WOULD PROPOSE BUT NO GUARANTEE  SINCE THIS IS A STEROID MED

## 2025-05-27 ENCOUNTER — APPOINTMENT (OUTPATIENT)
Dept: PRIMARY CARE | Facility: CLINIC | Age: 50
End: 2025-05-27
Payer: COMMERCIAL

## 2025-05-29 ENCOUNTER — TELEPHONE (OUTPATIENT)
Dept: PRIMARY CARE | Facility: CLINIC | Age: 50
End: 2025-05-29
Payer: COMMERCIAL

## 2025-05-30 NOTE — TELEPHONE ENCOUNTER
our PA request has been approved. Additional information will be provided in the approval communication. (Message [id number])  Approval Details    Authorized from May 30, 2025 to May 30, 2026

## 2025-06-17 ENCOUNTER — HOSPITAL ENCOUNTER (OUTPATIENT)
Dept: RADIOLOGY | Facility: CLINIC | Age: 50
Discharge: HOME | End: 2025-06-17
Payer: COMMERCIAL

## 2025-06-17 VITALS — WEIGHT: 177.29 LBS | HEIGHT: 63 IN | BODY MASS INDEX: 31.41 KG/M2

## 2025-06-17 DIAGNOSIS — Z12.31 ENCOUNTER FOR SCREENING MAMMOGRAM FOR MALIGNANT NEOPLASM OF BREAST: ICD-10-CM

## 2025-06-17 PROCEDURE — 77067 SCR MAMMO BI INCL CAD: CPT | Performed by: RADIOLOGY

## 2025-06-17 PROCEDURE — 77067 SCR MAMMO BI INCL CAD: CPT

## 2025-06-17 PROCEDURE — 77063 BREAST TOMOSYNTHESIS BI: CPT | Performed by: RADIOLOGY

## 2025-07-09 ENCOUNTER — APPOINTMENT (OUTPATIENT)
Dept: PRIMARY CARE | Facility: CLINIC | Age: 50
End: 2025-07-09
Payer: COMMERCIAL

## 2025-07-09 ENCOUNTER — TELEMEDICINE (OUTPATIENT)
Dept: PRIMARY CARE | Facility: CLINIC | Age: 50
End: 2025-07-09
Payer: COMMERCIAL

## 2025-07-09 VITALS — BODY MASS INDEX: 31.36 KG/M2 | WEIGHT: 177 LBS | HEIGHT: 63 IN

## 2025-07-09 DIAGNOSIS — I10 BENIGN ESSENTIAL HTN: ICD-10-CM

## 2025-07-09 DIAGNOSIS — M79.7 FIBROMYALGIA: Primary | ICD-10-CM

## 2025-07-09 DIAGNOSIS — G43.709 CHRONIC MIGRAINE WITHOUT AURA WITHOUT STATUS MIGRAINOSUS, NOT INTRACTABLE: ICD-10-CM

## 2025-07-09 DIAGNOSIS — J06.9 VIRAL UPPER RESPIRATORY ILLNESS: ICD-10-CM

## 2025-07-09 PROCEDURE — 99214 OFFICE O/P EST MOD 30 MIN: CPT | Performed by: INTERNAL MEDICINE

## 2025-07-09 PROCEDURE — 3008F BODY MASS INDEX DOCD: CPT | Performed by: INTERNAL MEDICINE

## 2025-07-09 PROCEDURE — 1036F TOBACCO NON-USER: CPT | Performed by: INTERNAL MEDICINE

## 2025-07-09 RX ORDER — CYCLOBENZAPRINE HCL 10 MG
10 TABLET ORAL DAILY PRN
Qty: 30 TABLET | Refills: 3 | Status: SHIPPED | OUTPATIENT
Start: 2025-07-09

## 2025-07-09 ASSESSMENT — ENCOUNTER SYMPTOMS
PALPITATIONS: 0
DIARRHEA: 0
COUGH: 0
CONSTIPATION: 0
DYSURIA: 0
EYES NEGATIVE: 1
FEVER: 0
DIZZINESS: 0
HEADACHES: 0
ABDOMINAL DISTENTION: 0
RHINORRHEA: 1
CARDIOVASCULAR NEGATIVE: 1
WEAKNESS: 0
NAUSEA: 0
ENDOCRINE NEGATIVE: 1
ABDOMINAL PAIN: 0
LIGHT-HEADEDNESS: 0
CHILLS: 0
WHEEZING: 0
GASTROINTESTINAL NEGATIVE: 1
NEUROLOGICAL NEGATIVE: 1
BACK PAIN: 0
FATIGUE: 1
VOMITING: 0
SHORTNESS OF BREATH: 0
PSYCHIATRIC NEGATIVE: 1
AGITATION: 0

## 2025-07-09 NOTE — PROGRESS NOTES
Subjective   Patient ID: Sandra Platt is a 50 y.o. female who presents for Follow-up (3 month fu ).  HPI  VIRTUAL VISIT  RHINITIS AND FATIGUE X SEVERAL DAYS WHICH ARE IMPROVING . LAB AND MAMMO F/U. MIGRAINE IS IMPROVING WITH CURRENT TX. HAS CHRONIC BODY ACHES WITH FIBROMYALGIA , FINISHED THE COURSE OF PREDNISONE TX (WHICH HELPED) .    Review of Systems   Constitutional:  Positive for fatigue. Negative for chills and fever.   HENT:  Positive for rhinorrhea. Negative for congestion and postnasal drip.    Eyes: Negative.  Negative for visual disturbance.   Respiratory:  Negative for cough, shortness of breath and wheezing.    Cardiovascular: Negative.  Negative for chest pain, palpitations and leg swelling.   Gastrointestinal: Negative.  Negative for abdominal distention, abdominal pain, constipation, diarrhea, nausea and vomiting.   Endocrine: Negative.    Genitourinary:  Negative for dysuria and urgency.   Musculoskeletal:  Negative for back pain.        CHRONIC BODY ACHES   Skin: Negative.  Negative for rash.   Allergic/Immunologic: Negative for immunocompromised state.   Neurological: Negative.  Negative for dizziness, weakness, light-headedness and headaches.   Psychiatric/Behavioral: Negative.  Negative for agitation.        Objective   Physical Exam  Constitutional:       General: She is not in acute distress.     Appearance: She is not ill-appearing.   Neurological:      Mental Status: She is alert.         Assessment/Plan   1. Fibromyalgia  Referral to Physical Therapy    cyclobenzaprine (Flexeril) 10 mg tablet      2. Chronic migraine without aura without status migrainosus, not intractable        3. Benign essential HTN        4. Viral upper respiratory illness        TEST RESULTS WERE DISCUSSED.  ADVISED TO APPLY WARM COMPRESSION AND OTC PAIN CREAM PRN FOR PAIN. WILL START PRN FLEXERIL ,AND PHYSICAL TX.  PT WAS INSTRUCTED TO INCREASE FLUID INTAKE ,TAKE TYLENOL PRN.    MDM    1) COMPLEXITY: MORE  THAN 1 STABLE CHRONIC CONDITION ADDRESSED  2)DATA: TESTS INTERPRETED AND OR ORDERED, TOOK INDEPENDENT HISTORY OR RECORDS REVIEWED  3)RISK: MODERATE RISK DUE TO NATURE OF MEDICAL CONDITIONS/COMORBIDITY OR MEDICATIONS ORDERED OR SURGICAL OR PROCEDURE REFERRAL, .       4 mon

## 2025-07-22 ENCOUNTER — EVALUATION (OUTPATIENT)
Dept: PHYSICAL THERAPY | Facility: CLINIC | Age: 50
End: 2025-07-22
Payer: COMMERCIAL

## 2025-07-22 DIAGNOSIS — R53.1 GENERALIZED WEAKNESS: Primary | ICD-10-CM

## 2025-07-22 DIAGNOSIS — M79.7 FIBROMYALGIA: ICD-10-CM

## 2025-07-22 PROCEDURE — 97110 THERAPEUTIC EXERCISES: CPT | Mod: GP

## 2025-07-22 PROCEDURE — 97161 PT EVAL LOW COMPLEX 20 MIN: CPT | Mod: GP

## 2025-07-22 ASSESSMENT — PATIENT HEALTH QUESTIONNAIRE - PHQ9
2. FEELING DOWN, DEPRESSED OR HOPELESS: NOT AT ALL
SUM OF ALL RESPONSES TO PHQ9 QUESTIONS 1 AND 2: 0
1. LITTLE INTEREST OR PLEASURE IN DOING THINGS: NOT AT ALL

## 2025-07-22 ASSESSMENT — ENCOUNTER SYMPTOMS
OCCASIONAL FEELINGS OF UNSTEADINESS: 1
DEPRESSION: 0
LOSS OF SENSATION IN FEET: 0

## 2025-07-22 ASSESSMENT — PAIN DESCRIPTION - DESCRIPTORS: DESCRIPTORS: THROBBING

## 2025-07-22 ASSESSMENT — PAIN SCALES - GENERAL: PAINLEVEL_OUTOF10: 3

## 2025-07-22 ASSESSMENT — PAIN - FUNCTIONAL ASSESSMENT: PAIN_FUNCTIONAL_ASSESSMENT: 0-10

## 2025-07-22 NOTE — PROGRESS NOTES
Physical Therapy    Physical Therapy Evaluation    Patient Name: Sandra Platt  MRN: [id number]  : 1975  Referring Physician: Destiny Jose  Today's Date: 2025  Time Calculation  Start Time: 1338  Stop Time: 1431  Time Calculation (min): 53 min  PT Evaluation Time Entry  PT Evaluation (Low) Time Entry: 25  PT Therapeutic Procedures Time Entry  Therapeutic Exercise Time Entry: 24           General  Reason for Referral: Fibromyalgia  Referred By: Dr. Jose  General Comment: Eval    Current Problem  Problem List Items Addressed This Visit           ICD-10-CM       Musculoskeletal and Injuries    Fibromyalgia M79.7    Relevant Orders    Follow Up In Physical Therapy       Symptoms and Signs    Generalized weakness - Primary R53.1    Relevant Orders    Follow Up In Physical Therapy          SUBJECTIVE  Subjective   Patient's name and  were confirmed this date.    Patient reports bilateral knee, foot pain, arm pain weakness that began slowly around 4 years ago. The pt reports hip and back pain that improved with diagnosis of fibromyalgia and starting new medication around 4 months ago. This is leading to difficulty with stair navigation, walking for exercise (uses treadmill for exercise when able), and standing for cooking, cleaning.  Pain is reported to range 3/10 with daily activities. The pt notes weakness at the arms and legs at times that comes and goes with activity such as stairs and reaching overhead. The pt notes gradual increase in activity level with new medication noting walking for exercise up to 1 hour now.       Prior level of function: Improvements over the past 4 months with new medication/diagnosis.     Patient states that their goal is to address joint pain and weakness for improved tolerance to activity with physical therapy intervention.      Precautions  Precautions  STEADI Fall Risk Score (The score of 4 or more indicates an increased risk of falling): 2  Precautions  Comment: General       Pain  Pain Assessment: 0-10  0-10 (Numeric) Pain Score: 3  Pain Type: Chronic pain  Pain Location: Foot (knees)  Pain Descriptors: Throbbing    Barriers to Participation: limited scheduling     OBJECTIVE:    Lower Extremity Strength:  LE strength not listed below is WNL  Date:  7/22/2025   MMT 5/5 max  LEFT RIGHT   Hip Flexion 5 5   Hip Extension 4+ 4+   Hip Abduction 4 4   Knee Extension 4+ 4+   Knee Flexion 4+ 4+   Ankle DF 5 5   Ankle PF 5 5   *Pain with testing    Upper Extremity Strength:  Date:  7/22/2025   MMT 5/5 max  LEFT RIGHT   Shoulder Flexion 4 4   Shoulder Abduction 4 4   Shoulder ER 4 4   Shoulder IR 4+ 4+   Elbow Flexion 5 5   Elbow Extension 4+ 4+   *Pain with testing    Lumbar AROM:   Date: 7/22/2025   Cervical AROM Left Right   Lumbar Flexion 100%   lumbar Extension 75%   Lumbar Side Bending 100% 100%   *Pain with testing    Muscle Flexibility: HS flexibility- min tightness B    Posture: moderate forward head, rounded shoulders     Gait mechanics: normalized gait pattern without limitations    Squatting technique: bilateral medial collapse with squatting to elevated mat table.     Palpation: N/T    Special tests:  Date: 7/22/2025   Straight Leg Raise: Negative Negative      Outcome Measure:   Not tested due to diagnosis     TREATMENT:  Therapeutic exercise:   Exercise: horizontal abduction with lime green band, 1x10  Exercise: B ER with lime green band, 1x10  Exercise: front raises dumbbell 1#, 1x10 ea  Exercise: lateral raises dumbbell 1#, 1x10 ea  Exercise: glut bridge with lime green band, 1x10  Exercise: clamshell side lying lime green band, 1x10  Exercise: SLR, 1x10 ea  Exercise: Squats- elevated mat table, 1x10    PATIENT EDUCATION/HEP:  Access Code: [email or web address]  URL: [email or web address]  Date: 07/22/2025  Prepared by: Kalia Cervantes    Exercises  - Standing Shoulder Horizontal Abduction with Resistance  - 1 x daily - 7 x weekly - 2-3 sets - 10  reps  - Shoulder External Rotation and Scapular Retraction with Resistance  - 1 x daily - 7 x weekly - 2-3 sets - 10 reps  - Standing Shoulder Flexion to 90 Degrees with Dumbbells  - 1 x daily - 7 x weekly - 2-3 sets - 10 reps  - Shoulder Abduction with Dumbbells - Palms Down  - 1 x daily - 7 x weekly - 2-3 sets - 10 reps  - Supine Bridge with Resistance Band  - 1 x daily - 7 x weekly - 2-3 sets - 10 reps  - Clamshell with Resistance  - 1 x daily - 7 x weekly - 2-3 sets - 10 reps  - Active Straight Leg Raise with Quad Set  - 1 x daily - 7 x weekly - 2-3 sets - 10 reps  - Squat with Chair Touch  - 1 x daily - 7 x weekly - 2-3 sets - 10 reps    ASSESSEMENT  Pt is a 50 y.o.  referred for physical therapy by Destiny Jose MD  for fibromyalgia with pt reporting generalized pain and weakness of the UE and LE with activity such as reaching overhead and stair navigation. Pt presents with Pain, Decreased Strength, and Decreased functional mobility  that is affecting Walking, Shoulder elevation, and stair navigation. This patient would benefit from a therapy program to restore prior level of function, decrease pain, increase strength and improve posture. Pt tolerated all treatment at this time.     Rehab potential: Excellent    Plan for next visit: Check response to HEP and progress as tolerated to develop HEP for self-management of pain and limitations with fibromyalgia.     PLAN  Treatment/Interventions: Aquatic therapy, Education/ Instruction, Cryotherapy, Hot pack, Neuromuscular re-education, Manual therapy, Therapeutic activities, Therapeutic exercises  PT Plan: Skilled PT  PT Frequency: 1 time per week (perfers every other week)  Duration: 6 weeks  Onset Date: 07/22/25  Certification Period Start Date: 07/22/25  Certification Period End Date: 09/22/25    Pt. Is in agreement with PT plan.  Goals:  Active       Fibromyalgia       The pt will decrease pain at the knees and feet to <1/10 or lower for improved tolerance  to walking long duration, exercising, and reaching overhead.         Start:  07/22/25    Expected End:  09/02/25            The pt will improve UE and LE strength to 5/5 grossly for improved tolerance to reaching overhead and descending stairs.         Start:  07/22/25    Expected End:  09/02/25            The pt will improve postural awareness for improved seated posture while working on computer.        Start:  07/22/25    Expected End:  09/02/25            The pt will demonstrate improve squatting technique without medial collapse for improved efficiency of transfers and decreased knee pain.        Start:  07/22/25    Expected End:  09/02/25

## 2025-08-05 ENCOUNTER — TREATMENT (OUTPATIENT)
Dept: PHYSICAL THERAPY | Facility: CLINIC | Age: 50
End: 2025-08-05
Payer: COMMERCIAL

## 2025-08-05 DIAGNOSIS — R53.1 GENERALIZED WEAKNESS: ICD-10-CM

## 2025-08-05 DIAGNOSIS — M79.7 FIBROMYALGIA: ICD-10-CM

## 2025-08-05 PROCEDURE — 97110 THERAPEUTIC EXERCISES: CPT | Mod: GP

## 2025-08-05 ASSESSMENT — PAIN SCALES - GENERAL: PAINLEVEL_OUTOF10: 2

## 2025-08-05 ASSESSMENT — PAIN DESCRIPTION - DESCRIPTORS: DESCRIPTORS: THROBBING

## 2025-08-05 ASSESSMENT — PAIN - FUNCTIONAL ASSESSMENT: PAIN_FUNCTIONAL_ASSESSMENT: 0-10

## 2025-08-05 NOTE — PROGRESS NOTES
Physical Therapy Treatment    Patient Name: Sandra Platt  MRN: [id number]  : 1975   Destiny Jose  Today's Date: 2025  Time Calculation  Start Time: 1400  Stop Time: 1444  Time Calculation (min): 44 min  PT Therapeutic Procedures Time Entry  Therapeutic Exercise Time Entry: 39           General  Reason for Referral: Fibromyalgia  Referred By: Dr. Jose  General Comment: Visit 1  Visit #2     Current Problem  Problem List Items Addressed This Visit           ICD-10-CM       Musculoskeletal and Injuries    Fibromyalgia M79.7       Symptoms and Signs    Generalized weakness R53.1            Subjective   The pt reports currently HEP is easy overall but she has not tried progressing to more repetitions yet. The pt reports good compliance. She is continuing to walk on treadmill for 1 hour for cardio exercise.      Pt. Reports compliance with HEP.    Precautions  Precautions  STEADI Fall Risk Score (The score of 4 or more indicates an increased risk of falling): 2  Precautions Comment: General    Pain  Pain Assessment: 0-10  0-10 (Numeric) Pain Score: 2  Pain Type: Chronic pain  Pain Location: Foot  Pain Descriptors: Throbbing    Objective      Objective measures not taken this date.       Treatment at Previous Session:  Therapeutic exercise:   Exercise: horizontal abduction with lime green band, 1x10  Exercise: B ER with lime green band, 1x10  Exercise: front raises dumbbell 1#, 1x10 ea  Exercise: lateral raises dumbbell 1#, 1x10 ea  Exercise: glut bridge with lime green band, 1x10  Exercise: clamshell side lying lime green band, 1x10  Exercise: SLR, 1x10 ea  Exercise: Squats- elevated mat table, 1x10    Treatments:  R Bike- 5 min, lvl 1   Therapeutic exercise:   Exercise: horizontal abduction with lime green band, 2x10  Exercise: B ER with lime green band, 2x10  Exercise: front raises dumbbell 2#, 2x10 ea  Exercise: lateral raises dumbbell 2#, 2x10 ea  Exercise: glut bridge with lime green band,  2x10  Exercise: clamshell side lying lime green band, 2x10  Exercise: SLR, 2x10 ea  Exercise: Squats- elevated mat table, 2x10  Exercise: side lying ER 2#, 2x10 ea   Exercise: Purple tubing shoulder extension, 2x10  Exercise: Purple tubing midrow, 2x10    Current HEP:   Access Code: [email or web address]  URL: [email or web address]  Date: 08/05/2025  Prepared by: Kalia Cervantes    Exercises  - Standing Shoulder Horizontal Abduction with Resistance  - 1 x daily - 7 x weekly - 2-3 sets - 10 reps  - Shoulder External Rotation and Scapular Retraction with Resistance  - 1 x daily - 7 x weekly - 2-3 sets - 10 reps  - Standing Shoulder Flexion to 90 Degrees with Dumbbells  - 1 x daily - 7 x weekly - 2-3 sets - 10 reps  - Shoulder Abduction with Dumbbells - Palms Down  - 1 x daily - 7 x weekly - 2-3 sets - 10 reps  - Supine Bridge with Resistance Band  - 1 x daily - 7 x weekly - 2-3 sets - 10 reps  - Clamshell with Resistance  - 1 x daily - 7 x weekly - 2-3 sets - 10 reps  - Active Straight Leg Raise with Quad Set  - 1 x daily - 7 x weekly - 2-3 sets - 10 reps  - Squat with Chair Touch  - 1 x daily - 7 x weekly - 2-3 sets - 10 reps  - Standing Shoulder Row with Anchored Resistance  - 1 x daily - 7 x weekly - 2-3 sets - 10 reps  - Shoulder extension with resistance - Neutral  - 1 x daily - 7 x weekly - 2-3 sets - 10 reps  - Sidelying Shoulder External Rotation  - 1 x daily - 7 x weekly - 2-3 sets - 10 reps    Assessment:     Progressed exercises from HEP with additional repetitions by adding second set of movements. Side lying ER was added to program to progress strengthening of shoulders. The pt was cued during SLR to avoid large movement and use opposite LE to measure height of movement. Midrow and shoulder extension were added to progress periscapular strengthening for postural stability.     Plan:    Plan for next visit:   Progress as tolerated with additional UE/LE strengthening.     OP PT  Plan  Treatment/Interventions: Aquatic therapy, Education/ Instruction, Cryotherapy, Hot pack, Neuromuscular re-education, Manual therapy, Therapeutic activities, Therapeutic exercises  PT Plan: Skilled PT  PT Frequency: 1 time per week  Duration: 6 weeks  Onset Date: 07/22/25  Certification Period Start Date: 07/22/25  Certification Period End Date: 09/22/25    Goals:  Active       Fibromyalgia       The pt will decrease pain at the knees and feet to <1/10 or lower for improved tolerance to walking long duration, exercising, and reaching overhead.         Start:  07/22/25    Expected End:  09/02/25            The pt will improve UE and LE strength to 5/5 grossly for improved tolerance to reaching overhead and descending stairs.         Start:  07/22/25    Expected End:  09/02/25            The pt will improve postural awareness for improved seated posture while working on computer.        Start:  07/22/25    Expected End:  09/02/25            The pt will demonstrate improve squatting technique without medial collapse for improved efficiency of transfers and decreased knee pain.        Start:  07/22/25    Expected End:  09/02/25

## 2025-08-19 ENCOUNTER — TREATMENT (OUTPATIENT)
Dept: PHYSICAL THERAPY | Facility: CLINIC | Age: 50
End: 2025-08-19
Payer: COMMERCIAL

## 2025-08-19 DIAGNOSIS — M79.7 FIBROMYALGIA: ICD-10-CM

## 2025-08-19 DIAGNOSIS — R53.1 GENERALIZED WEAKNESS: ICD-10-CM

## 2025-08-19 PROCEDURE — 97110 THERAPEUTIC EXERCISES: CPT | Mod: GP

## 2025-08-19 ASSESSMENT — PAIN SCALES - GENERAL: PAINLEVEL_OUTOF10: 0 - NO PAIN

## 2025-08-19 ASSESSMENT — PAIN - FUNCTIONAL ASSESSMENT: PAIN_FUNCTIONAL_ASSESSMENT: 0-10

## 2025-09-02 ENCOUNTER — DOCUMENTATION (OUTPATIENT)
Dept: PHYSICAL THERAPY | Facility: CLINIC | Age: 50
End: 2025-09-02
Payer: COMMERCIAL

## 2025-11-11 ENCOUNTER — APPOINTMENT (OUTPATIENT)
Dept: PRIMARY CARE | Facility: CLINIC | Age: 50
End: 2025-11-11
Payer: COMMERCIAL

## 2026-04-09 ENCOUNTER — APPOINTMENT (OUTPATIENT)
Facility: CLINIC | Age: 51
End: 2026-04-09
Payer: COMMERCIAL